# Patient Record
Sex: FEMALE | Race: WHITE | NOT HISPANIC OR LATINO | Employment: OTHER | URBAN - METROPOLITAN AREA
[De-identification: names, ages, dates, MRNs, and addresses within clinical notes are randomized per-mention and may not be internally consistent; named-entity substitution may affect disease eponyms.]

---

## 2023-09-19 ENCOUNTER — OFFICE VISIT (OUTPATIENT)
Dept: PRIMARY CARE | Facility: CLINIC | Age: 78
End: 2023-09-19
Payer: MEDICARE

## 2023-09-19 VITALS
OXYGEN SATURATION: 97 % | DIASTOLIC BLOOD PRESSURE: 70 MMHG | WEIGHT: 167 LBS | TEMPERATURE: 97.1 F | HEIGHT: 62 IN | SYSTOLIC BLOOD PRESSURE: 130 MMHG | BODY MASS INDEX: 30.73 KG/M2 | HEART RATE: 68 BPM

## 2023-09-19 DIAGNOSIS — E03.9 ACQUIRED HYPOTHYROIDISM: Chronic | ICD-10-CM

## 2023-09-19 DIAGNOSIS — Z12.31 ENCOUNTER FOR SCREENING MAMMOGRAM FOR MALIGNANT NEOPLASM OF BREAST: ICD-10-CM

## 2023-09-19 DIAGNOSIS — G47.09 OTHER INSOMNIA: ICD-10-CM

## 2023-09-19 DIAGNOSIS — I10 PRIMARY HYPERTENSION: Chronic | ICD-10-CM

## 2023-09-19 DIAGNOSIS — Z00.00 ROUTINE GENERAL MEDICAL EXAMINATION AT HEALTH CARE FACILITY: Primary | ICD-10-CM

## 2023-09-19 DIAGNOSIS — K90.0 CELIAC DISEASE (HHS-HCC): Chronic | ICD-10-CM

## 2023-09-19 DIAGNOSIS — K21.9 GASTROESOPHAGEAL REFLUX DISEASE WITHOUT ESOPHAGITIS: Chronic | ICD-10-CM

## 2023-09-19 DIAGNOSIS — Z78.0 POSTMENOPAUSAL STATUS (AGE-RELATED) (NATURAL): ICD-10-CM

## 2023-09-19 DIAGNOSIS — Z95.818 STATUS POST PLACEMENT OF IMPLANTABLE LOOP RECORDER: ICD-10-CM

## 2023-09-19 DIAGNOSIS — M54.2 CERVICALGIA: ICD-10-CM

## 2023-09-19 DIAGNOSIS — J30.9 ALLERGIC RHINITIS, UNSPECIFIED SEASONALITY, UNSPECIFIED TRIGGER: Chronic | ICD-10-CM

## 2023-09-19 DIAGNOSIS — Z23 NEED FOR PNEUMOCOCCAL VACCINE: ICD-10-CM

## 2023-09-19 DIAGNOSIS — E78.5 HYPERLIPIDEMIA, UNSPECIFIED HYPERLIPIDEMIA TYPE: ICD-10-CM

## 2023-09-19 DIAGNOSIS — Z23 FLU VACCINE NEED: ICD-10-CM

## 2023-09-19 DIAGNOSIS — Z87.898 HISTORY OF SYNCOPE: ICD-10-CM

## 2023-09-19 PROBLEM — M19.019 OSTEOARTHRITIS OF ACROMIOCLAVICULAR JOINT: Chronic | Status: ACTIVE | Noted: 2023-09-19

## 2023-09-19 PROBLEM — L71.9 ROSACEA: Chronic | Status: ACTIVE | Noted: 2023-09-19

## 2023-09-19 PROCEDURE — 1159F MED LIST DOCD IN RCRD: CPT | Performed by: FAMILY MEDICINE

## 2023-09-19 PROCEDURE — 3078F DIAST BP <80 MM HG: CPT | Performed by: FAMILY MEDICINE

## 2023-09-19 PROCEDURE — 3075F SYST BP GE 130 - 139MM HG: CPT | Performed by: FAMILY MEDICINE

## 2023-09-19 PROCEDURE — G0009 ADMIN PNEUMOCOCCAL VACCINE: HCPCS | Performed by: FAMILY MEDICINE

## 2023-09-19 PROCEDURE — 90662 IIV NO PRSV INCREASED AG IM: CPT | Performed by: FAMILY MEDICINE

## 2023-09-19 PROCEDURE — 99204 OFFICE O/P NEW MOD 45 MIN: CPT | Performed by: FAMILY MEDICINE

## 2023-09-19 PROCEDURE — G0439 PPPS, SUBSEQ VISIT: HCPCS | Performed by: FAMILY MEDICINE

## 2023-09-19 PROCEDURE — 1160F RVW MEDS BY RX/DR IN RCRD: CPT | Performed by: FAMILY MEDICINE

## 2023-09-19 PROCEDURE — G0008 ADMIN INFLUENZA VIRUS VAC: HCPCS | Performed by: FAMILY MEDICINE

## 2023-09-19 PROCEDURE — 90677 PCV20 VACCINE IM: CPT | Performed by: FAMILY MEDICINE

## 2023-09-19 PROCEDURE — 1036F TOBACCO NON-USER: CPT | Performed by: FAMILY MEDICINE

## 2023-09-19 PROCEDURE — 1170F FXNL STATUS ASSESSED: CPT | Performed by: FAMILY MEDICINE

## 2023-09-19 RX ORDER — OMEPRAZOLE 40 MG/1
CAPSULE, DELAYED RELEASE ORAL
COMMUNITY
Start: 2023-08-06 | End: 2024-03-19 | Stop reason: SDUPTHER

## 2023-09-19 RX ORDER — CETIRIZINE HYDROCHLORIDE 10 MG/1
TABLET, CHEWABLE ORAL DAILY
COMMUNITY
End: 2024-03-19 | Stop reason: ENTERED-IN-ERROR

## 2023-09-19 RX ORDER — AMITRIPTYLINE HYDROCHLORIDE 25 MG/1
TABLET, FILM COATED ORAL
COMMUNITY
Start: 2023-09-03 | End: 2024-03-19 | Stop reason: SDUPTHER

## 2023-09-19 RX ORDER — AMLODIPINE BESYLATE 5 MG/1
TABLET ORAL
COMMUNITY
Start: 2023-08-30 | End: 2024-03-19 | Stop reason: SDUPTHER

## 2023-09-19 RX ORDER — METRONIDAZOLE 7.5 MG/G
CREAM TOPICAL 2 TIMES DAILY
COMMUNITY

## 2023-09-19 RX ORDER — FLUTICASONE PROPIONATE 50 MCG
SPRAY, SUSPENSION (ML) NASAL
COMMUNITY
Start: 2023-08-31

## 2023-09-19 RX ORDER — DICLOFENAC SODIUM 10 MG/G
4 GEL TOPICAL 4 TIMES DAILY PRN
COMMUNITY

## 2023-09-19 RX ORDER — MONTELUKAST SODIUM 10 MG/1
TABLET ORAL
COMMUNITY
Start: 2023-08-25 | End: 2024-03-19 | Stop reason: SDUPTHER

## 2023-09-19 RX ORDER — LEVOTHYROXINE SODIUM 25 UG/1
TABLET ORAL
COMMUNITY
Start: 2023-09-03 | End: 2024-03-19 | Stop reason: SDUPTHER

## 2023-09-19 ASSESSMENT — ENCOUNTER SYMPTOMS
RHINORRHEA: 0
SLEEP DISTURBANCE: 1
FEVER: 0
NECK STIFFNESS: 0
ABDOMINAL PAIN: 0
CONSTIPATION: 0
NECK PAIN: 1
NAUSEA: 0
SORE THROAT: 0
FATIGUE: 0
DIARRHEA: 0
PALPITATIONS: 0
VOMITING: 0
SHORTNESS OF BREATH: 0
COUGH: 0
CHILLS: 0
APPETITE CHANGE: 0

## 2023-09-19 ASSESSMENT — ACTIVITIES OF DAILY LIVING (ADL)
DRESSING: INDEPENDENT
DOING_HOUSEWORK: INDEPENDENT
MANAGING_FINANCES: INDEPENDENT
GROCERY_SHOPPING: INDEPENDENT
TAKING_MEDICATION: INDEPENDENT
BATHING: INDEPENDENT

## 2023-09-19 ASSESSMENT — PATIENT HEALTH QUESTIONNAIRE - PHQ9
2. FEELING DOWN, DEPRESSED OR HOPELESS: NOT AT ALL
SUM OF ALL RESPONSES TO PHQ9 QUESTIONS 1 AND 2: 0
1. LITTLE INTEREST OR PLEASURE IN DOING THINGS: NOT AT ALL

## 2023-09-19 NOTE — PROGRESS NOTES
"Subjective   Reason for Visit: Christine Atwood is an 78 y.o. female here for a Medicare Wellness visit.     Past Medical, Surgical, and Family History reviewed and updated in chart.    Reviewed all medications by prescribing practitioner or clinical pharmacist (such as prescriptions, OTCs, herbal therapies and supplements) and documented in the medical record.    Christine presents to establish care. She was previously following with doctor in [place].    Diagnosed with Celiac disease. Adheres closely to gluten-free diet.     Taking thyroid medication. Due for labs to recheck levels.     History of syncope. Had implantable loop recorder placed in 2021. Needs cardiologist to follow with in [place]. Previously seeing Dr. Clfi Kidd in [place].    On amitriptyline for sleep. Overall works well but having some restless sleep.         Patient Care Team:  Dorothea Francois DO as PCP - General (Family Medicine)     Review of Systems   Constitutional:  Negative for appetite change, chills, fatigue and fever.   HENT:  Negative for congestion, rhinorrhea and sore throat.    Respiratory:  Negative for cough and shortness of breath.    Cardiovascular:  Negative for chest pain and palpitations.   Gastrointestinal:  Negative for abdominal pain, constipation, diarrhea, nausea and vomiting.   Musculoskeletal:  Positive for neck pain. Negative for neck stiffness.   Skin:  Negative for rash.   Psychiatric/Behavioral:  Positive for sleep disturbance.        Objective   Vitals:  /70   Pulse 68   Temp 36.2 °C (97.1 °F)   Ht 1.575 m (5' 2\")   Wt 75.8 kg (167 lb)   SpO2 97%   BMI 30.54 kg/m²       Physical Exam  Constitutional:       General: She is not in acute distress.     Appearance: Normal appearance.   HENT:      Head: Normocephalic.      Nose: No congestion.      Mouth/Throat:      Mouth: Mucous membranes are moist.   Eyes:      Extraocular Movements: Extraocular movements intact.      Conjunctiva/sclera: " Conjunctivae normal.   Cardiovascular:      Rate and Rhythm: Normal rate and regular rhythm.      Heart sounds: No murmur heard.  Pulmonary:      Effort: Pulmonary effort is normal.      Breath sounds: No wheezing or rhonchi.   Musculoskeletal:         General: No swelling.      Cervical back: Tenderness (left trapezius) present. No rigidity or bony tenderness. Normal range of motion.   Skin:     General: Skin is warm and dry.   Neurological:      General: No focal deficit present.      Mental Status: She is alert.   Psychiatric:         Mood and Affect: Mood normal.         Behavior: Behavior normal.         Assessment/Plan   Problem List Items Addressed This Visit       Acquired hypothyroidism (Chronic)    Current Assessment & Plan     Check TSH. Continue levothyroxine 25mcg.         Relevant Orders    TSH with reflex to Free T4 if abnormal    TSH with reflex to Free T4 if abnormal    Primary hypertension (Chronic)    Current Assessment & Plan     Controlled.          Relevant Orders    Comprehensive Metabolic Panel    Comprehensive Metabolic Panel    Allergic rhinitis (Chronic)    Current Assessment & Plan     Continue montelukast and cetirizine.         Gastroesophageal reflux disease without esophagitis (Chronic)    Current Assessment & Plan     Continue omeprazole.         Other insomnia    Current Assessment & Plan     Continue amitriptyline.          Celiac disease (Chronic)    Current Assessment & Plan     Continue gluten-free diet.          Status post placement of implantable loop recorder    Current Assessment & Plan     Refer to cardiology for follow-up. Request records from Dr. Kidd in [place].          Relevant Orders    Referral to Cardiology    Cervicalgia    Current Assessment & Plan     Recommend heat, exercises.           Other Visit Diagnoses       Routine general medical examination at health care facility    -  Primary    Hyperlipidemia, unspecified hyperlipidemia type        Relevant  Orders    Lipid Panel    Postmenopausal status (age-related) (natural)        Relevant Orders    XR DEXA bone density    History of syncope        Relevant Orders    Referral to Cardiology    Flu vaccine need        Relevant Orders    Flu vaccine, quadrivalent, high-dose, preservative free, age 65y+ (FLUZONE) (Completed)    Need for pneumococcal vaccine        Relevant Orders    Pneumococcal conjugate vaccine, 20-valent, adult (PREVNAR 20) (Completed)    Encounter for screening mammogram for malignant neoplasm of breast        Relevant Orders    BI mammo bilateral screening tomosynthesis

## 2023-10-09 ENCOUNTER — APPOINTMENT (OUTPATIENT)
Dept: CARDIOLOGY | Facility: CLINIC | Age: 78
End: 2023-10-09
Payer: MEDICARE

## 2023-10-13 ENCOUNTER — APPOINTMENT (OUTPATIENT)
Dept: RADIOLOGY | Facility: CLINIC | Age: 78
End: 2023-10-13
Payer: MEDICARE

## 2023-11-07 ENCOUNTER — APPOINTMENT (OUTPATIENT)
Dept: RADIOLOGY | Facility: CLINIC | Age: 78
End: 2023-11-07
Payer: MEDICARE

## 2023-11-08 ENCOUNTER — CLINICAL SUPPORT (OUTPATIENT)
Dept: AUDIOLOGY | Facility: HOSPITAL | Age: 78
End: 2023-11-08

## 2023-11-08 DIAGNOSIS — H90.3 SENSORINEURAL HEARING LOSS, BILATERAL: Primary | ICD-10-CM

## 2023-11-08 PROCEDURE — V5299 HEARING SERVICE: HCPCS | Performed by: AUDIOLOGIST

## 2023-11-08 ASSESSMENT — PAIN SCALES - GENERAL: PAINLEVEL_OUTOF10: 0 - NO PAIN

## 2023-11-08 ASSESSMENT — PAIN - FUNCTIONAL ASSESSMENT: PAIN_FUNCTIONAL_ASSESSMENT: 0-10

## 2023-11-08 NOTE — PROGRESS NOTES
.chiefAUDIOLOGY HEARING AID CHECK      Name:  Christine Atwood  :  1945  Age:  78 y.o.  Date of Appointment:  2023     History:  Ms. Atwood is seen today for Hearing Aid Check (Transferring service from out of state provider.  She reports that the hearing aids have not sounded clear recently.  She reports no other concerns with the hearing aids today.)    Current hearing aids:  Hearing Aids:  Phonak Audeo M50R  Right # [id number]    Left # [id number]  Speakers: ___   Domes: medium vented    Purchased: out of state, approximately 2019  Repair/loss warranty ends:   Last hearing evaluation: unknown, patient signed release of records form and records have been requested from previous provider    Upon initial examination, the hearing aids sounded good.      Cleaned and checked both devices.    Replaced filters bilaterally.  Replaced domes bilaterally.     Both hearing aids sound and look good on final examination.      Otoscopic observation revealed clear ear canals bilaterally.    Per data logging, this patient wears the hearing aids 7.5 hours / day on average, 76 % in calm environments, 20 % speech in noise, 4 % comfort in noise.  She has not connected her hearing aids to any bluetooth devices and does not wish to do so.    IMPRESSIONS:  Both hearing aids function appropriately following today's routine maintenance procedures.  The patient also reports the aids sound good.      RECOMMENDATIONS:  -Daily use of hearing aids.  -Daily maintenance to be performed at home.   -Follow-up hearing aid check in 6 months or as needed    PATIENT EDUCATION:   Discussed above information with Ms. Atwood .  Questions were addressed and the patient was encouraged to contact our department should concerns arise.    Time:  13:52 to 14:20    FRANCESCA Topete, [facility]  Licensed Audiologist

## 2023-11-13 ENCOUNTER — OFFICE VISIT (OUTPATIENT)
Dept: CARDIOLOGY | Facility: CLINIC | Age: 78
End: 2023-11-13
Payer: MEDICARE

## 2023-11-13 ENCOUNTER — HOSPITAL ENCOUNTER (OUTPATIENT)
Dept: CARDIOLOGY | Facility: CLINIC | Age: 78
Discharge: HOME | End: 2023-11-13
Payer: MEDICARE

## 2023-11-13 VITALS
BODY MASS INDEX: 30.73 KG/M2 | SYSTOLIC BLOOD PRESSURE: 137 MMHG | HEIGHT: 62 IN | HEART RATE: 84 BPM | WEIGHT: 167 LBS | DIASTOLIC BLOOD PRESSURE: 61 MMHG | OXYGEN SATURATION: 98 %

## 2023-11-13 DIAGNOSIS — Z95.818 STATUS POST PLACEMENT OF IMPLANTABLE LOOP RECORDER: ICD-10-CM

## 2023-11-13 DIAGNOSIS — Z87.898 HISTORY OF SYNCOPE: Primary | ICD-10-CM

## 2023-11-13 DIAGNOSIS — Z87.898 HISTORY OF SYNCOPE: ICD-10-CM

## 2023-11-13 DIAGNOSIS — R55 SYNCOPE AND COLLAPSE: ICD-10-CM

## 2023-11-13 DIAGNOSIS — R55 SYNCOPE AND COLLAPSE: Primary | ICD-10-CM

## 2023-11-13 PROCEDURE — 99204 OFFICE O/P NEW MOD 45 MIN: CPT | Performed by: INTERNAL MEDICINE

## 2023-11-13 PROCEDURE — 3075F SYST BP GE 130 - 139MM HG: CPT | Performed by: INTERNAL MEDICINE

## 2023-11-13 PROCEDURE — 93005 ELECTROCARDIOGRAM TRACING: CPT | Performed by: INTERNAL MEDICINE

## 2023-11-13 PROCEDURE — 1159F MED LIST DOCD IN RCRD: CPT | Performed by: INTERNAL MEDICINE

## 2023-11-13 PROCEDURE — 99214 OFFICE O/P EST MOD 30 MIN: CPT | Performed by: INTERNAL MEDICINE

## 2023-11-13 PROCEDURE — 93291 INTERROG DEV EVAL SCRMS IP: CPT

## 2023-11-13 PROCEDURE — 1160F RVW MEDS BY RX/DR IN RCRD: CPT | Performed by: INTERNAL MEDICINE

## 2023-11-13 PROCEDURE — 1036F TOBACCO NON-USER: CPT | Performed by: INTERNAL MEDICINE

## 2023-11-13 PROCEDURE — 93291 INTERROG DEV EVAL SCRMS IP: CPT | Performed by: INTERNAL MEDICINE

## 2023-11-13 PROCEDURE — 1126F AMNT PAIN NOTED NONE PRSNT: CPT | Performed by: INTERNAL MEDICINE

## 2023-11-13 PROCEDURE — 3078F DIAST BP <80 MM HG: CPT | Performed by: INTERNAL MEDICINE

## 2023-11-13 ASSESSMENT — ENCOUNTER SYMPTOMS
DEPRESSION: 0
OCCASIONAL FEELINGS OF UNSTEADINESS: 0
SYNCOPE: 1
PSYCHIATRIC NEGATIVE: 1
RESPIRATORY NEGATIVE: 1
DIARRHEA: 1
ENDOCRINE NEGATIVE: 1
MUSCULOSKELETAL NEGATIVE: 1
CONSTITUTIONAL NEGATIVE: 1
EYES NEGATIVE: 1
LOSS OF SENSATION IN FEET: 0

## 2023-11-13 ASSESSMENT — PAIN SCALES - GENERAL: PAINLEVEL: 0-NO PAIN

## 2023-11-13 NOTE — PROGRESS NOTES
CHARLES Atwood is an 79 y/o female referred by her PCP, Dr Francois, to establish care for management of her ILR.      PMH includes HTN, hypothyroidism, celiac disease, GERD, OA, and syncope s/p BIOTRONIK ILR implant (2021 [facility]).      Pt recently establish with her PCP after moving from [place].  Pt has a history of an ILR placement for a syncopal episode that occurred in 2021.  She had been following with Dr Clif Kidd in [place].      She has not been told of any arrhythmias on her loop by her monitoring cardiologist.     Pt recently presented to [facility] ED with lightheadedness, nausea and diaphoresis lasting less than 1 min.    She reported that if felt similar to her prior vasovagal episodes.  Pts work up in the ED was unremarkable.  She presents today for evaluation of syncope.        Review of Systems   Constitutional: Negative.   HENT: Negative.     Eyes: Negative.    Cardiovascular:  Positive for syncope.   Respiratory: Negative.     Endocrine: Negative.    Skin: Negative.    Musculoskeletal: Negative.    Gastrointestinal:  Positive for diarrhea.   Genitourinary: Negative.    Psychiatric/Behavioral: Negative.         Objective   Constitutional:       Appearance: Healthy appearance. Not in distress.   Eyes:      Pupils: Pupils are equal, round, and reactive to light.   Neck:      Thyroid: Thyroid normal.      Vascular: JVD normal.   Pulmonary:      Effort: Pulmonary effort is normal.      Breath sounds: Normal breath sounds.   Musculoskeletal: Normal range of motion.      Cervical back: Normal range of motion and neck supple. Skin:     General: Skin is warm and moist.   Neurological:      General: No focal deficit present.      Mental Status: Alert and oriented to person, place and time.      Motor: Motor function is intact.      Gait: Gait is intact.       Assessment/Plan   Diagnoses of Status post placement of implantable loop recorder and History of syncope were pertinent to this  visit.    PRESENTS TO ESTABLISH IN OUR DEVICE CLINICS     Hx OF VASO-VAGAL SYNCOPE     HAS A BIOTRONIK LOOP MONITOR - NO ARRHYTHMIAS DETECTED THUS FAR PER PT.  LAST EPISODE OF SYNCOPE PT WAS SEEN AT [facility] ED. W/U WAS UNREMARKABLE BUT THEY WERE UNABLE TO INTERROGATE HER LOOP MONITOR.     TODAY'S ECG: SINUS RHYTHM AT 84 bpm; NONSPECIFIC ST CHANGES.        WE WILL ENROLL HER IN OUR DEVICE CLINIC.   WE CONTACT ED BIOTRONIK AND WERE ABLE TO INTERROGATE HER LOOP.  IT SHOWED SIGNIFICANT BRADYCARDIA DURING HER LAST EPISODE OF VASOVAGAL SYNCOPE 10/18/23, WHICH IS EXPECTED. SHE DOES NOT HAVE SINUS SRAVAN USUALLY.   IT ALSO SHOWED ONE EPISODE OF VT FOR 6-8 SECs IN OCT. 2022.    WE WILL CONT MONITORING  ALSO, WILL REFER TO NEUROLOGY AUTONOMIC CLINIC               MD Joseph Sutherland Master Clinician of Cardiovascular [place].   [facility] [place].   Director of Electrophysiology Center  Professor of Medicine.   [employer].

## 2023-11-22 ENCOUNTER — HOSPITAL ENCOUNTER (OUTPATIENT)
Dept: RADIOLOGY | Facility: CLINIC | Age: 78
Discharge: HOME | End: 2023-11-22
Payer: MEDICARE

## 2023-11-22 DIAGNOSIS — Z12.31 ENCOUNTER FOR SCREENING MAMMOGRAM FOR MALIGNANT NEOPLASM OF BREAST: ICD-10-CM

## 2023-11-22 PROCEDURE — 77067 SCR MAMMO BI INCL CAD: CPT | Mod: BILATERAL PROCEDURE | Performed by: RADIOLOGY

## 2023-11-22 PROCEDURE — 77063 BREAST TOMOSYNTHESIS BI: CPT | Mod: BILATERAL PROCEDURE | Performed by: RADIOLOGY

## 2023-11-22 PROCEDURE — 77067 SCR MAMMO BI INCL CAD: CPT

## 2023-12-05 ENCOUNTER — TELEPHONE (OUTPATIENT)
Dept: PRIMARY CARE | Facility: CLINIC | Age: 78
End: 2023-12-05
Payer: MEDICARE

## 2023-12-05 DIAGNOSIS — R92.8 ABNORMAL MAMMOGRAM OF RIGHT BREAST: Primary | ICD-10-CM

## 2023-12-05 NOTE — TELEPHONE ENCOUNTER
Please let patient know that her mammogram was abnormal. There was an abnormality seen in the right breast. The radiologist is requesting further images to help determine cause. I have ordered another mammogram and US to further evaluate. I would recommend she try to see if she can have old mammogram images sent in CD from hospital in [place] where previous mammograms done.

## 2023-12-08 ENCOUNTER — HOSPITAL ENCOUNTER (OUTPATIENT)
Dept: RADIOLOGY | Facility: EXTERNAL LOCATION | Age: 78
Discharge: HOME | End: 2023-12-08

## 2023-12-19 ENCOUNTER — TELEPHONE (OUTPATIENT)
Dept: PRIMARY CARE | Facility: CLINIC | Age: 78
End: 2023-12-19
Payer: MEDICARE

## 2023-12-19 NOTE — TELEPHONE ENCOUNTER
----- Message from Dorothea Francois, DO sent at 12/19/2023  3:22 PM EST -----  Please let patient know that the radiologist did review her CD of prior mammograms, but they are still seeing a new abnormality and recommend proceed with mammogram and US to further assess.

## 2024-01-04 ENCOUNTER — HOSPITAL ENCOUNTER (OUTPATIENT)
Dept: RADIOLOGY | Facility: HOSPITAL | Age: 79
Discharge: HOME | End: 2024-01-04
Payer: MEDICARE

## 2024-01-04 DIAGNOSIS — R92.8 ABNORMAL MAMMOGRAM OF RIGHT BREAST: ICD-10-CM

## 2024-01-04 PROCEDURE — 76982 USE 1ST TARGET LESION: CPT | Mod: RT

## 2024-01-04 PROCEDURE — 77065 DX MAMMO INCL CAD UNI: CPT | Mod: RT

## 2024-01-04 PROCEDURE — 76641 ULTRASOUND BREAST COMPLETE: CPT | Mod: RT

## 2024-01-23 ENCOUNTER — APPOINTMENT (OUTPATIENT)
Dept: PRIMARY CARE | Facility: CLINIC | Age: 79
End: 2024-01-23
Payer: MEDICARE

## 2024-02-02 ENCOUNTER — OFFICE VISIT (OUTPATIENT)
Dept: PRIMARY CARE | Facility: CLINIC | Age: 79
End: 2024-02-02
Payer: MEDICARE

## 2024-02-02 VITALS
TEMPERATURE: 97.4 F | SYSTOLIC BLOOD PRESSURE: 122 MMHG | WEIGHT: 167 LBS | DIASTOLIC BLOOD PRESSURE: 58 MMHG | HEART RATE: 56 BPM | BODY MASS INDEX: 30.54 KG/M2 | OXYGEN SATURATION: 96 %

## 2024-02-02 DIAGNOSIS — R55 SYNCOPE, UNSPECIFIED SYNCOPE TYPE: Primary | ICD-10-CM

## 2024-02-02 DIAGNOSIS — F41.9 ANXIETY: ICD-10-CM

## 2024-02-02 DIAGNOSIS — Z95.818 STATUS POST PLACEMENT OF IMPLANTABLE LOOP RECORDER: ICD-10-CM

## 2024-02-02 PROCEDURE — 3074F SYST BP LT 130 MM HG: CPT | Performed by: FAMILY MEDICINE

## 2024-02-02 PROCEDURE — 1126F AMNT PAIN NOTED NONE PRSNT: CPT | Performed by: FAMILY MEDICINE

## 2024-02-02 PROCEDURE — 1160F RVW MEDS BY RX/DR IN RCRD: CPT | Performed by: FAMILY MEDICINE

## 2024-02-02 PROCEDURE — 3078F DIAST BP <80 MM HG: CPT | Performed by: FAMILY MEDICINE

## 2024-02-02 PROCEDURE — 1159F MED LIST DOCD IN RCRD: CPT | Performed by: FAMILY MEDICINE

## 2024-02-02 PROCEDURE — 99215 OFFICE O/P EST HI 40 MIN: CPT | Performed by: FAMILY MEDICINE

## 2024-02-02 PROCEDURE — 1036F TOBACCO NON-USER: CPT | Performed by: FAMILY MEDICINE

## 2024-02-02 RX ORDER — SERTRALINE HYDROCHLORIDE 25 MG/1
25 TABLET, FILM COATED ORAL DAILY
Qty: 30 TABLET | Refills: 0 | Status: SHIPPED | OUTPATIENT
Start: 2024-02-02 | End: 2024-03-19 | Stop reason: ALTCHOICE

## 2024-02-02 RX ORDER — SERTRALINE HYDROCHLORIDE 25 MG/1
25 TABLET, FILM COATED ORAL DAILY
Qty: 90 TABLET | Refills: 0 | Status: SHIPPED | OUTPATIENT
Start: 2024-02-02 | End: 2024-03-19 | Stop reason: ALTCHOICE

## 2024-02-02 ASSESSMENT — ENCOUNTER SYMPTOMS
FEVER: 0
HEADACHES: 0
CHILLS: 0
COUGH: 0
NUMBNESS: 0
DIZZINESS: 0
SHORTNESS OF BREATH: 0
ABDOMINAL PAIN: 0
DIARRHEA: 0
VOMITING: 0
NAUSEA: 0
NERVOUS/ANXIOUS: 1
WEAKNESS: 0

## 2024-02-02 NOTE — PROGRESS NOTES
Subjective   Patient ID: Christine Atwood is a 78 y.o. female who presents for PT Treatment (Discuss order for PT ).    Christine presents to discuss syncope. Patient reports that she has had four episodes of syncope since the fall. Most recent episode was yesterday. Was feeling nervous before going down to breakfast. Got up to go to bathroom during breakfast. Was sitting on toilet when felt lightheaded and passed out. Came to a few minutes later. Felt tired for 15 minutes but then felt better. Does have implantable loop recorder. She is unsure if results are being sent to Dr. Vasquez. Patient states that she would like to see a different cardiologist moving forward.     Patient has noticed that she is more anxious over the past few months.  agrees that patient has a significant amount of anxiety. Is wondering if this may be affecting her episodes of syncope.          Review of Systems   Constitutional:  Negative for chills and fever.   Respiratory:  Negative for cough and shortness of breath.    Cardiovascular:  Negative for chest pain.   Gastrointestinal:  Negative for abdominal pain, diarrhea, nausea and vomiting.   Neurological:  Positive for syncope. Negative for dizziness, weakness, numbness and headaches.   Psychiatric/Behavioral:  The patient is nervous/anxious.        Objective   /58   Pulse 56   Temp 36.3 °C (97.4 °F)   Wt 75.8 kg (167 lb)   SpO2 96%   BMI 30.54 kg/m²     Physical Exam  Constitutional:       General: She is not in acute distress.     Appearance: Normal appearance.   HENT:      Head: Normocephalic.      Mouth/Throat:      Mouth: Mucous membranes are moist.   Eyes:      Extraocular Movements: Extraocular movements intact.      Conjunctiva/sclera: Conjunctivae normal.   Cardiovascular:      Rate and Rhythm: Normal rate and regular rhythm.      Heart sounds: No murmur heard.  Pulmonary:      Breath sounds: No wheezing or rhonchi.   Musculoskeletal:      Cervical back: Neck supple.    Skin:     General: Skin is warm and dry.   Neurological:      Mental Status: She is alert.   Psychiatric:         Mood and Affect: Mood is anxious.         Behavior: Behavior normal.         Assessment/Plan   Problem List Items Addressed This Visit             ICD-10-CM    Status post placement of implantable loop recorder Z95.818     Patient prefers not to follow-up with Dr. Vasquez. Referred to Dr. Rojo.          Relevant Orders    Referral to Cardiology    Syncope - Primary R55     Likely vasovagal episode, though there may be psychogenic component. Patient referred to new EP doc per her request. Has implantable loop recorder; will see if data can be uploaded.         Relevant Orders    Referral to Cardiology    Anxiety F41.9     Start sertraline 25mg.         Relevant Medications    sertraline (Zoloft) 25 mg tablet    sertraline (Zoloft) 25 mg tablet        Time Based Billing:  - Prep Time on Date of Patient Encounter:  3 minutes  - Time Directly with Patient/Family/Caregiver:   35 minutes  - Documentation Time:   5 minutes    Total Minutes:  43

## 2024-02-02 NOTE — ASSESSMENT & PLAN NOTE
Likely vasovagal episode, though there may be psychogenic component. Patient referred to new EP doc per her request. Has implantable loop recorder; will see if data can be uploaded.

## 2024-02-22 NOTE — PROGRESS NOTES
[facility] Heart and Vascular Electrophysiology    Patient Name: Christine Atwood  Patient : 1945    Referred  for      Christine Atwood is a 78 y.o. year old female patient with    1.  Hypertension  2.  Hypothyroidism  3.  Syncope: Patient has a history of syncope and had Biotronik ILR placed in  when she lived in [place].  She more recently presented to [facility] emergency department with lightheadedness, nausea, diaphoresis that lasted approximately 1 minute.  She followed up with Dr. Vasquez in 2023 and at that time heart ILR showed significant bradycardia during the episode of near syncope in October.  It also showed 1 episode of NSVT for 6 to 8 seconds in 2022.  She was referred to autonomic neurology clinic.     Patient here for evaluation for syncope. She reports she has had multiple syncopal episodes in the past few years. She has had 4-5 episodes since October. Her last episode was in 2024 as while she was in the dinning room at her independent living facility. She had gone to bathroom during the meal. As she was having a bowel movement the next thing she remembers is waking up on the floor. She reports she had felt off the morning, like anxious. Her  has witnessed episodes which she is unrousable for 3-4 min. No further syncopal episodes. She was started on Sertraline about 1 month ago.     Past Medical History:  She has a past medical history of Hypertension and Hypothyroidism.    Past Surgical History:  She has a past surgical history that includes Hysterectomy; Appendectomy; and Breast biopsy.      Social History:  She reports that she has never smoked. She has never used smokeless tobacco. She reports that she does not currently use alcohol. She reports that she does not use drugs.    Family History:  Family History   Problem Relation Name Age of Onset    Epilepsy Mother      Cirrhosis Mother      Dementia Half-Sister      Lymphoma Half-Sister      Hypertension  "Half-Sister      Atrial fibrillation Half-Brother      Hyperlipidemia Half-Brother          Allergies:  Amoxicillin, Biaxin [clarithromycin], Cephalexin, Clindamycin, and Penicillins    Outpatient Medications:  Current Outpatient Medications   Medication Instructions    amitriptyline (Elavil) 25 mg tablet     amLODIPine (Norvasc) 5 mg tablet     cetirizine (ZyrTEC) 10 mg chewable tablet oral, Daily    diclofenac sodium (VOLTAREN) 4 g, Topical, 4 times daily PRN    fluticasone (Flonase) 50 mcg/actuation nasal spray     levothyroxine (Synthroid, Levoxyl) 25 mcg tablet     metroNIDAZOLE (Metrocream) 0.75 % cream Topical, 2 times daily    montelukast (Singulair) 10 mg tablet     omeprazole (PriLOSEC) 40 mg DR capsule     sertraline (ZOLOFT) 25 mg, oral, Daily    sertraline (ZOLOFT) 25 mg, oral, Daily        ROS:  A 14 point review of systems was done and is negative other than as stated in HPI    Vitals:  Vitals:    02/29/24 1548   BP: 122/74   Pulse: 84   Weight: 73.5 kg (162 lb)   Height: 1.575 m (5' 2\")       Physical Exam  Constitutional:       Appearance: Normal appearance.   Cardiovascular:      Rate and Rhythm: Normal rate and regular rhythm.      Pulses: Normal pulses.      Heart sounds: Normal heart sounds.   Pulmonary:      Effort: Pulmonary effort is normal.      Breath sounds: Normal breath sounds.   Musculoskeletal:         General: Normal range of motion.      Right lower leg: No edema.      Left lower leg: No edema.   Skin:     General: Skin is warm and dry.   Neurological:      Mental Status: She is alert and oriented to person, place, and time.   Psychiatric:         Mood and Affect: Mood normal.        Labs:   No results found for: \"WBC\", \"HGB\", \"HCT\", \"PLT\", \"ALT\", \"AST\", \"NA\", \"K\", \"CL\", \"CREATININE\", \"BUN\", \"CO2\", \"TSH\", \"INR\", \"GLUF\"     Cardiac Testing:  ECG    Echocardiogram  No results found for this or any previous visit from the past 1095 days.      Assessment:   Syncope:  Mostly likely these " episodes are vasovagal related. Her last episode of syncope with significant bradycardia but other episodes with minimal slowing. Likely mixed vasodepressor and cardioinhibitory. We asked patient to avoid prolonged standing, stay well hydrated, and increase sodium intake to help prevent further episodes.     Plan:  Tilt table test with Dr Reyna CM report check  Continue current medications

## 2024-02-29 ENCOUNTER — OFFICE VISIT (OUTPATIENT)
Dept: CARDIOLOGY | Facility: CLINIC | Age: 79
End: 2024-02-29
Payer: MEDICARE

## 2024-02-29 VITALS
BODY MASS INDEX: 29.81 KG/M2 | WEIGHT: 162 LBS | HEART RATE: 84 BPM | SYSTOLIC BLOOD PRESSURE: 122 MMHG | DIASTOLIC BLOOD PRESSURE: 74 MMHG | HEIGHT: 62 IN

## 2024-02-29 DIAGNOSIS — R55 SYNCOPE AND COLLAPSE: ICD-10-CM

## 2024-02-29 DIAGNOSIS — Z87.898 HISTORY OF SYNCOPE: Primary | ICD-10-CM

## 2024-02-29 PROCEDURE — 1160F RVW MEDS BY RX/DR IN RCRD: CPT | Performed by: INTERNAL MEDICINE

## 2024-02-29 PROCEDURE — 1126F AMNT PAIN NOTED NONE PRSNT: CPT | Performed by: INTERNAL MEDICINE

## 2024-02-29 PROCEDURE — 99214 OFFICE O/P EST MOD 30 MIN: CPT | Performed by: INTERNAL MEDICINE

## 2024-02-29 PROCEDURE — 1036F TOBACCO NON-USER: CPT | Performed by: INTERNAL MEDICINE

## 2024-02-29 PROCEDURE — 3074F SYST BP LT 130 MM HG: CPT | Performed by: INTERNAL MEDICINE

## 2024-02-29 PROCEDURE — 93000 ELECTROCARDIOGRAM COMPLETE: CPT | Performed by: INTERNAL MEDICINE

## 2024-02-29 PROCEDURE — 3078F DIAST BP <80 MM HG: CPT | Performed by: INTERNAL MEDICINE

## 2024-02-29 PROCEDURE — 1159F MED LIST DOCD IN RCRD: CPT | Performed by: INTERNAL MEDICINE

## 2024-02-29 NOTE — PATIENT INSTRUCTIONS
Tilt table test  Remote ILR check  Please push remote button if you experience any further episodes of passing out.

## 2024-03-04 ENCOUNTER — APPOINTMENT (OUTPATIENT)
Dept: NEUROLOGY | Facility: CLINIC | Age: 79
End: 2024-03-04
Payer: MEDICARE

## 2024-03-07 ENCOUNTER — TELEPHONE (OUTPATIENT)
Dept: CARDIOLOGY | Facility: CLINIC | Age: 79
End: 2024-03-07
Payer: MEDICARE

## 2024-03-07 NOTE — TELEPHONE ENCOUNTER
Called patient with instructions. NPO after midnight.  Aware of date and time of arrival.  No caffeine for 24 hours prior, no ETOH or nicotine for 6 hours prior.  Someone needs to drive you home after.  No lotions or creams , wear comfortable clothing.  She expressed understanding.           ----- Message from Rohini Mcgraw sent at 3/6/2024  1:23 PM EST -----  Regarding: Tilt 4/10/2024  Patient is scheduled for Tilt on: 4/10/2024  Arrival: 10:45  Please call pt with instructions. Thank you.

## 2024-03-12 ENCOUNTER — LAB (OUTPATIENT)
Dept: LAB | Facility: LAB | Age: 79
End: 2024-03-12
Payer: MEDICARE

## 2024-03-12 DIAGNOSIS — E78.5 HYPERLIPIDEMIA, UNSPECIFIED HYPERLIPIDEMIA TYPE: ICD-10-CM

## 2024-03-12 DIAGNOSIS — E03.9 ACQUIRED HYPOTHYROIDISM: ICD-10-CM

## 2024-03-12 DIAGNOSIS — I10 PRIMARY HYPERTENSION: Chronic | ICD-10-CM

## 2024-03-12 LAB
ALBUMIN SERPL BCP-MCNC: 4 G/DL (ref 3.4–5)
ALP SERPL-CCNC: 64 U/L (ref 33–136)
ALT SERPL W P-5'-P-CCNC: 19 U/L (ref 7–45)
ANION GAP SERPL CALC-SCNC: 13 MMOL/L (ref 10–20)
AST SERPL W P-5'-P-CCNC: 14 U/L (ref 9–39)
BILIRUB SERPL-MCNC: 0.4 MG/DL (ref 0–1.2)
BUN SERPL-MCNC: 16 MG/DL (ref 6–23)
CALCIUM SERPL-MCNC: 9.1 MG/DL (ref 8.6–10.3)
CHLORIDE SERPL-SCNC: 102 MMOL/L (ref 98–107)
CHOLEST SERPL-MCNC: 240 MG/DL (ref 0–199)
CHOLESTEROL/HDL RATIO: 5.2
CO2 SERPL-SCNC: 27 MMOL/L (ref 21–32)
CREAT SERPL-MCNC: 0.84 MG/DL (ref 0.5–1.05)
EGFRCR SERPLBLD CKD-EPI 2021: 70 ML/MIN/1.73M*2
GLUCOSE SERPL-MCNC: 150 MG/DL (ref 74–99)
HDLC SERPL-MCNC: 46.4 MG/DL
LDLC SERPL CALC-MCNC: 130 MG/DL
NON HDL CHOLESTEROL: 194 MG/DL (ref 0–149)
POTASSIUM SERPL-SCNC: 4 MMOL/L (ref 3.5–5.3)
PROT SERPL-MCNC: 6.9 G/DL (ref 6.4–8.2)
SODIUM SERPL-SCNC: 138 MMOL/L (ref 136–145)
T4 FREE SERPL-MCNC: 0.63 NG/DL (ref 0.61–1.12)
TRIGL SERPL-MCNC: 320 MG/DL (ref 0–149)
TSH SERPL-ACNC: 4.77 MIU/L (ref 0.44–3.98)
VLDL: 64 MG/DL (ref 0–40)

## 2024-03-12 PROCEDURE — 84443 ASSAY THYROID STIM HORMONE: CPT

## 2024-03-12 PROCEDURE — 84439 ASSAY OF FREE THYROXINE: CPT

## 2024-03-12 PROCEDURE — 80061 LIPID PANEL: CPT

## 2024-03-12 PROCEDURE — 36415 COLL VENOUS BLD VENIPUNCTURE: CPT

## 2024-03-12 PROCEDURE — 80053 COMPREHEN METABOLIC PANEL: CPT

## 2024-03-18 ENCOUNTER — APPOINTMENT (OUTPATIENT)
Dept: CARDIOLOGY | Facility: CLINIC | Age: 79
End: 2024-03-18
Payer: MEDICARE

## 2024-03-19 ENCOUNTER — TELEPHONE (OUTPATIENT)
Dept: PRIMARY CARE | Facility: CLINIC | Age: 79
End: 2024-03-19

## 2024-03-19 ENCOUNTER — OFFICE VISIT (OUTPATIENT)
Dept: PRIMARY CARE | Facility: CLINIC | Age: 79
End: 2024-03-19
Payer: MEDICARE

## 2024-03-19 VITALS
HEART RATE: 90 BPM | SYSTOLIC BLOOD PRESSURE: 126 MMHG | WEIGHT: 169 LBS | BODY MASS INDEX: 30.91 KG/M2 | TEMPERATURE: 97.3 F | DIASTOLIC BLOOD PRESSURE: 76 MMHG | OXYGEN SATURATION: 98 %

## 2024-03-19 DIAGNOSIS — K21.9 GASTROESOPHAGEAL REFLUX DISEASE WITHOUT ESOPHAGITIS: Chronic | ICD-10-CM

## 2024-03-19 DIAGNOSIS — L72.0 MILIA: ICD-10-CM

## 2024-03-19 DIAGNOSIS — J30.9 ALLERGIC RHINITIS, UNSPECIFIED SEASONALITY, UNSPECIFIED TRIGGER: Chronic | ICD-10-CM

## 2024-03-19 DIAGNOSIS — K90.0 CELIAC DISEASE (HHS-HCC): Chronic | ICD-10-CM

## 2024-03-19 DIAGNOSIS — I10 PRIMARY HYPERTENSION: Chronic | ICD-10-CM

## 2024-03-19 DIAGNOSIS — E03.9 ACQUIRED HYPOTHYROIDISM: Primary | Chronic | ICD-10-CM

## 2024-03-19 DIAGNOSIS — R73.9 HYPERGLYCEMIA: ICD-10-CM

## 2024-03-19 DIAGNOSIS — G47.09 OTHER INSOMNIA: ICD-10-CM

## 2024-03-19 DIAGNOSIS — L71.9 ROSACEA: Chronic | ICD-10-CM

## 2024-03-19 DIAGNOSIS — F41.9 ANXIETY: ICD-10-CM

## 2024-03-19 PROBLEM — M54.2 CERVICALGIA: Status: RESOLVED | Noted: 2023-09-19 | Resolved: 2024-03-19

## 2024-03-19 PROBLEM — M18.12 ARTHRITIS OF CARPOMETACARPAL (CMC) JOINT OF LEFT THUMB: Status: ACTIVE | Noted: 2024-03-19

## 2024-03-19 PROCEDURE — 99215 OFFICE O/P EST HI 40 MIN: CPT | Performed by: FAMILY MEDICINE

## 2024-03-19 PROCEDURE — 1036F TOBACCO NON-USER: CPT | Performed by: FAMILY MEDICINE

## 2024-03-19 PROCEDURE — 3078F DIAST BP <80 MM HG: CPT | Performed by: FAMILY MEDICINE

## 2024-03-19 PROCEDURE — 1160F RVW MEDS BY RX/DR IN RCRD: CPT | Performed by: FAMILY MEDICINE

## 2024-03-19 PROCEDURE — 3074F SYST BP LT 130 MM HG: CPT | Performed by: FAMILY MEDICINE

## 2024-03-19 PROCEDURE — 1159F MED LIST DOCD IN RCRD: CPT | Performed by: FAMILY MEDICINE

## 2024-03-19 RX ORDER — MONTELUKAST SODIUM 10 MG/1
10 TABLET ORAL NIGHTLY
Qty: 90 TABLET | Refills: 1 | Status: SHIPPED | OUTPATIENT
Start: 2024-03-19

## 2024-03-19 RX ORDER — AMITRIPTYLINE HYDROCHLORIDE 25 MG/1
TABLET, FILM COATED ORAL
Qty: 30 TABLET | Refills: 0
Start: 2024-03-19 | End: 2024-05-19

## 2024-03-19 RX ORDER — CETIRIZINE HYDROCHLORIDE 10 MG/1
10 TABLET ORAL DAILY
Qty: 90 TABLET | Refills: 1 | Status: CANCELLED | OUTPATIENT
Start: 2024-03-19

## 2024-03-19 RX ORDER — LEVOTHYROXINE SODIUM 25 UG/1
TABLET ORAL
Qty: 120 TABLET | Refills: 0 | Status: SHIPPED | OUTPATIENT
Start: 2024-03-19

## 2024-03-19 RX ORDER — SERTRALINE HYDROCHLORIDE 25 MG/1
25 TABLET, FILM COATED ORAL DAILY
Qty: 90 TABLET | Refills: 1 | Status: SHIPPED | OUTPATIENT
Start: 2024-03-19 | End: 2024-04-01

## 2024-03-19 RX ORDER — CETIRIZINE HYDROCHLORIDE 10 MG/1
10 TABLET ORAL DAILY
Qty: 90 TABLET | Refills: 1 | Status: SHIPPED | OUTPATIENT
Start: 2024-03-19

## 2024-03-19 RX ORDER — OMEPRAZOLE 40 MG/1
40 CAPSULE, DELAYED RELEASE ORAL DAILY
Qty: 90 CAPSULE | Refills: 1 | Status: SHIPPED | OUTPATIENT
Start: 2024-03-19

## 2024-03-19 RX ORDER — AMLODIPINE BESYLATE 5 MG/1
5 TABLET ORAL DAILY
Qty: 90 TABLET | Refills: 1 | Status: SHIPPED | OUTPATIENT
Start: 2024-03-19

## 2024-03-19 ASSESSMENT — ENCOUNTER SYMPTOMS
CHILLS: 0
CONSTIPATION: 0
DYSURIA: 0
HEADACHES: 0
VOMITING: 0
DIARRHEA: 0
COUGH: 0
SHORTNESS OF BREATH: 0
ABDOMINAL PAIN: 0
NAUSEA: 0
FEVER: 0

## 2024-03-19 NOTE — TELEPHONE ENCOUNTER
Let patient know that we received a message from her insurance that cetirizine will not be covered under her plan. Will need to get the medication OTC>

## 2024-03-19 NOTE — PROGRESS NOTES
Subjective   Patient ID: Christine Atwood is a 78 y.o. female who presents for Anxiety, GERD, Hypertension, and Hypothyroidism (Review BW).    Christine presents for follow-up. She has been feeling well. No further syncopal episodes since last visit. Does occasionally get dizzy. Did establish with cardiology. Has device to indicate when having symptoms for implantable loop recorder.     She has a skin lesion by her nose that she would like to have examined. It is not itchy or bleeding.     Also has difficulty sleeping. Often wakes up during the night. Was prescribed amitriptyline for sleep but is unsure how well it is working.          Review of Systems   Constitutional:  Negative for chills and fever.   HENT:  Negative for congestion.    Respiratory:  Negative for cough and shortness of breath.    Cardiovascular:  Negative for chest pain.   Gastrointestinal:  Negative for abdominal pain, constipation, diarrhea, nausea and vomiting.   Genitourinary:  Negative for dysuria.   Neurological:  Negative for headaches.       Objective   /76   Pulse 90   Temp 36.3 °C (97.3 °F)   Wt 76.7 kg (169 lb)   SpO2 98%   BMI 30.91 kg/m²     Physical Exam  Constitutional:       General: She is not in acute distress.     Appearance: Normal appearance.   HENT:      Head: Normocephalic.      Nose: No congestion.      Mouth/Throat:      Mouth: Mucous membranes are moist.   Eyes:      Extraocular Movements: Extraocular movements intact.      Conjunctiva/sclera: Conjunctivae normal.   Cardiovascular:      Rate and Rhythm: Normal rate and regular rhythm.      Heart sounds: No murmur heard.  Pulmonary:      Effort: Pulmonary effort is normal.      Breath sounds: No wheezing or rhonchi.   Abdominal:      Palpations: Abdomen is soft.      Tenderness: There is no abdominal tenderness.   Musculoskeletal:         General: No swelling or tenderness.   Skin:     General: Skin is warm and dry.      Comments: White papule at left nasal fold. No  erythema or ulceration.   Neurological:      General: No focal deficit present.      Mental Status: She is alert.   Psychiatric:         Mood and Affect: Mood normal.         Behavior: Behavior normal.         Assessment/Plan   Problem List Items Addressed This Visit             ICD-10-CM    Acquired hypothyroidism - Primary (Chronic) E03.9     Increase levothyroxine to 1.5 tabs on Sunday; continue 1 tab all other days.         Relevant Medications    levothyroxine (Synthroid, Levoxyl) 25 mcg tablet    Other Relevant Orders    Thyroid Stimulating Hormone    Primary hypertension (Chronic) I10    Relevant Medications    amLODIPine (Norvasc) 5 mg tablet    Allergic rhinitis (Chronic) J30.9    Relevant Medications    montelukast (Singulair) 10 mg tablet    cetirizine (ZyrTEC) 10 mg tablet    Gastroesophageal reflux disease without esophagitis (Chronic) K21.9    Relevant Medications    omeprazole (PriLOSEC) 40 mg DR capsule    Other insomnia G47.09     Taper off amitriptyline.          Relevant Medications    amitriptyline (Elavil) 25 mg tablet    Celiac disease (Chronic) K90.0    Rosacea (Chronic) L71.9     Continue metronidazole as needed.          Anxiety F41.9     Stable.          Relevant Medications    sertraline (Zoloft) 25 mg tablet     Other Visit Diagnoses         Codes    Hyperglycemia     R73.9    Relevant Orders    Hemoglobin A1C    Milia     L72.0    Discussed removal; patient declines at this time.                Time Based Billing:  - Prep Time on Date of Patient Encounter:  5 minutes  - Time Directly with Patient/Family/Caregiver:   40 minutes  - Documentation Time:   7 minutes    Total Minutes:  52

## 2024-03-30 DIAGNOSIS — F41.9 ANXIETY: ICD-10-CM

## 2024-04-01 RX ORDER — SERTRALINE HYDROCHLORIDE 25 MG/1
25 TABLET, FILM COATED ORAL DAILY
Qty: 90 TABLET | Refills: 1 | Status: SHIPPED | OUTPATIENT
Start: 2024-04-01

## 2024-04-10 ENCOUNTER — HOSPITAL ENCOUNTER (OUTPATIENT)
Dept: CARDIOLOGY | Facility: HOSPITAL | Age: 79
Discharge: HOME | End: 2024-04-10
Payer: MEDICARE

## 2024-04-10 VITALS — HEART RATE: 82 BPM | DIASTOLIC BLOOD PRESSURE: 62 MMHG | SYSTOLIC BLOOD PRESSURE: 130 MMHG

## 2024-04-10 DIAGNOSIS — R55 SYNCOPE AND COLLAPSE: ICD-10-CM

## 2024-04-10 PROCEDURE — 93660 TILT TABLE EVALUATION: CPT | Performed by: INTERNAL MEDICINE

## 2024-04-10 PROCEDURE — 93660 TILT TABLE EVALUATION: CPT

## 2024-04-10 ASSESSMENT — PAIN SCALES - GENERAL: PAINLEVEL_OUTOF10: 0 - NO PAIN

## 2024-04-10 ASSESSMENT — PAIN - FUNCTIONAL ASSESSMENT: PAIN_FUNCTIONAL_ASSESSMENT: 0-10

## 2024-05-13 ENCOUNTER — CLINICAL SUPPORT (OUTPATIENT)
Dept: AUDIOLOGY | Facility: HOSPITAL | Age: 79
End: 2024-05-13

## 2024-05-13 DIAGNOSIS — H90.3 SENSORINEURAL HEARING LOSS, BILATERAL: Primary | ICD-10-CM

## 2024-05-13 PROCEDURE — V5299 HEARING SERVICE: HCPCS | Performed by: AUDIOLOGIST

## 2024-05-13 NOTE — PROGRESS NOTES
AUDIOLOGY HEARING AID CHECK      Name:  Christine Atwood  :  1945  Age:  79 y.o.  Date of Appointment:  2024     History:  Ms. Atwood is seen today for Hearing Aid Check (About 1 month ago, the left hearing aid .  She and  changed the filter or dome,and the hearing aid worked.  )  No  other problems were reported today.       Current hearing aids:  Hearing Aids:  Phonak Audeo M50R  Right # [id number]    Left # [id number]  Speakers: #0M (4.0)   Domes: medium vented    [place]  Repair/loss warranty ends:   Last hearing evaluation:     Upon initial examination, the hearing aids sounded good.      Cleaned and checked both devices.    Replaced filters bilaterally.  Replaced domes bilaterally.     Both hearing aids sound and look good on final examination.      Otoscopic observation revealed clear ear canals bilaterally.    Per data logging, this patient wears the hearing aids 9 hours / day on average      IMPRESSIONS:  Both hearing aids function appropriately following today's routine maintenance procedures.  The patient also reports the aids sound good.  We discussed that we can change to the new #5 receivers with cerustop filters, that might be easier for her to change at home.  She will consider.      RECOMMENDATIONS:  -Daily use of hearing aids.  -Daily maintenance to be performed at home.   -Follow-up hearing aid check in 6 months or as needed.  Appointment is scheduled for 2024 at 10am.  -Patient to consider replacement of hearing aids if any changes or problems are noticed going forward, as the current devices are near their end of life.   -Consider new hearing evaluation as her last one was about 5 years ago.  She will discuss this with her physician.      PATIENT EDUCATION:   Discussed above information with Ms. Atwood.  Questions were addressed and the patient was encouraged to contact our department should concerns  arise.    Time:  10:11 to 10:32    FRANCESCA Topete, CCC-A  Senior Audiologist

## 2024-06-10 ENCOUNTER — TELEPHONE (OUTPATIENT)
Dept: CARDIOLOGY | Facility: CLINIC | Age: 79
End: 2024-06-10
Payer: MEDICARE

## 2024-06-10 NOTE — TELEPHONE ENCOUNTER
Talked with patient to let her know her tilt table test was negative for vasovagal response. Patient reports she has been feeling very well with no  further symptoms since starting sertaline. Device clinic has not been able to interrogate ILR so I ask patient to call [facility] [facility] to allow device clinic to interrogate ILR. Patient is agreeable and will let us know if she has any further concerns.

## 2024-06-12 ENCOUNTER — LAB (OUTPATIENT)
Dept: LAB | Facility: LAB | Age: 79
End: 2024-06-12
Payer: MEDICARE

## 2024-06-12 DIAGNOSIS — E03.9 ACQUIRED HYPOTHYROIDISM: Chronic | ICD-10-CM

## 2024-06-12 DIAGNOSIS — I10 PRIMARY HYPERTENSION: Chronic | ICD-10-CM

## 2024-06-12 DIAGNOSIS — R73.9 HYPERGLYCEMIA: ICD-10-CM

## 2024-06-12 LAB
ALBUMIN SERPL BCP-MCNC: 4.3 G/DL (ref 3.4–5)
ALP SERPL-CCNC: 69 U/L (ref 33–136)
ALT SERPL W P-5'-P-CCNC: 25 U/L (ref 7–45)
ANION GAP SERPL CALC-SCNC: 13 MMOL/L (ref 10–20)
AST SERPL W P-5'-P-CCNC: 17 U/L (ref 9–39)
BILIRUB SERPL-MCNC: 0.5 MG/DL (ref 0–1.2)
BUN SERPL-MCNC: 17 MG/DL (ref 6–23)
CALCIUM SERPL-MCNC: 9.6 MG/DL (ref 8.6–10.3)
CHLORIDE SERPL-SCNC: 102 MMOL/L (ref 98–107)
CO2 SERPL-SCNC: 28 MMOL/L (ref 21–32)
CREAT SERPL-MCNC: 0.69 MG/DL (ref 0.5–1.05)
EGFRCR SERPLBLD CKD-EPI 2021: 87 ML/MIN/1.73M*2
GLUCOSE SERPL-MCNC: 101 MG/DL (ref 74–99)
POTASSIUM SERPL-SCNC: 4.4 MMOL/L (ref 3.5–5.3)
PROT SERPL-MCNC: 7.3 G/DL (ref 6.4–8.2)
SODIUM SERPL-SCNC: 139 MMOL/L (ref 136–145)
TSH SERPL-ACNC: 3.23 MIU/L (ref 0.44–3.98)

## 2024-06-12 PROCEDURE — 80053 COMPREHEN METABOLIC PANEL: CPT

## 2024-06-12 PROCEDURE — 36415 COLL VENOUS BLD VENIPUNCTURE: CPT

## 2024-06-12 PROCEDURE — 84443 ASSAY THYROID STIM HORMONE: CPT

## 2024-06-12 PROCEDURE — 83036 HEMOGLOBIN GLYCOSYLATED A1C: CPT

## 2024-06-13 LAB
EST. AVERAGE GLUCOSE BLD GHB EST-MCNC: 128 MG/DL
HBA1C MFR BLD: 6.1 %

## 2024-06-18 ENCOUNTER — APPOINTMENT (OUTPATIENT)
Dept: PRIMARY CARE | Facility: CLINIC | Age: 79
End: 2024-06-18
Payer: MEDICARE

## 2024-06-18 VITALS
WEIGHT: 167 LBS | TEMPERATURE: 97.8 F | OXYGEN SATURATION: 96 % | DIASTOLIC BLOOD PRESSURE: 82 MMHG | HEART RATE: 70 BPM | SYSTOLIC BLOOD PRESSURE: 126 MMHG | BODY MASS INDEX: 30.54 KG/M2

## 2024-06-18 DIAGNOSIS — I10 PRIMARY HYPERTENSION: Chronic | ICD-10-CM

## 2024-06-18 DIAGNOSIS — K90.0 CELIAC DISEASE (HHS-HCC): Chronic | ICD-10-CM

## 2024-06-18 DIAGNOSIS — E03.9 ACQUIRED HYPOTHYROIDISM: Primary | Chronic | ICD-10-CM

## 2024-06-18 DIAGNOSIS — H90.3 SENSORINEURAL HEARING LOSS, BILATERAL: ICD-10-CM

## 2024-06-18 DIAGNOSIS — R73.03 PREDIABETES: ICD-10-CM

## 2024-06-18 DIAGNOSIS — S46.812A STRAIN OF LEFT SUBSCAPULARIS MUSCLE, INITIAL ENCOUNTER: ICD-10-CM

## 2024-06-18 DIAGNOSIS — E78.5 HYPERLIPIDEMIA, UNSPECIFIED HYPERLIPIDEMIA TYPE: ICD-10-CM

## 2024-06-18 PROBLEM — L72.0 MILIA: Status: ACTIVE | Noted: 2024-06-18

## 2024-06-18 PROBLEM — R55 SYNCOPE: Status: RESOLVED | Noted: 2024-02-02 | Resolved: 2024-06-18

## 2024-06-18 PROCEDURE — 3074F SYST BP LT 130 MM HG: CPT | Performed by: FAMILY MEDICINE

## 2024-06-18 PROCEDURE — 3079F DIAST BP 80-89 MM HG: CPT | Performed by: FAMILY MEDICINE

## 2024-06-18 PROCEDURE — 1159F MED LIST DOCD IN RCRD: CPT | Performed by: FAMILY MEDICINE

## 2024-06-18 PROCEDURE — 1036F TOBACCO NON-USER: CPT | Performed by: FAMILY MEDICINE

## 2024-06-18 PROCEDURE — 1160F RVW MEDS BY RX/DR IN RCRD: CPT | Performed by: FAMILY MEDICINE

## 2024-06-18 PROCEDURE — 99214 OFFICE O/P EST MOD 30 MIN: CPT | Performed by: FAMILY MEDICINE

## 2024-06-18 RX ORDER — CHOLECALCIFEROL (VITAMIN D3) 125 MCG
125 CAPSULE ORAL DAILY
COMMUNITY

## 2024-06-18 RX ORDER — ZINC GLUCONATE 50 MG
TABLET ORAL DAILY
COMMUNITY

## 2024-06-18 RX ORDER — LANOLIN ALCOHOL/MO/W.PET/CERES
3000 CREAM (GRAM) TOPICAL DAILY
COMMUNITY

## 2024-06-18 RX ORDER — LEVOTHYROXINE SODIUM 25 UG/1
TABLET ORAL
Qty: 120 TABLET | Refills: 1 | Status: SHIPPED | OUTPATIENT
Start: 2024-06-18

## 2024-06-18 RX ORDER — VIT C/E/ZN/COPPR/LUTEIN/ZEAXAN 250MG-90MG
CAPSULE ORAL
COMMUNITY

## 2024-06-18 RX ORDER — BISMUTH SUBSALICYLATE 262 MG
1 TABLET,CHEWABLE ORAL DAILY
COMMUNITY

## 2024-06-18 ASSESSMENT — ENCOUNTER SYMPTOMS
SLEEP DISTURBANCE: 0
ABDOMINAL PAIN: 0
NERVOUS/ANXIOUS: 0
CHILLS: 0
DIARRHEA: 0
COUGH: 0
FEVER: 0
VOMITING: 0
NAUSEA: 0
SHORTNESS OF BREATH: 0

## 2024-06-18 NOTE — PROGRESS NOTES
Subjective   Patient ID: Christine Atwood is a 79 y.o. female who presents for Hypothyroidism (Review BW).    Christine has been feeling well. Taking increased dose of thyroid medication without side effects. Labs done to reassess.    Her dizziness has improved after tapering off amitriptyline. No adverse effects after stopping medication.     Her anxiety has improved since starting sertraline. Sometimes she takes an extra dose if feeling more anxious than usual; wants to make sure that this is safe.     She has been having intermittent sharp, stabbing pain at left shoulder blade. Lasts a few seconds then resolves once she repositions.     Needs new referral for hearing evaluation.          Review of Systems   Constitutional:  Negative for chills and fever.   Respiratory:  Negative for cough and shortness of breath.    Cardiovascular:  Negative for chest pain.   Gastrointestinal:  Negative for abdominal pain, diarrhea, nausea and vomiting.   Psychiatric/Behavioral:  Negative for sleep disturbance. The patient is not nervous/anxious.        Objective   /82   Pulse 70   Temp 36.6 °C (97.8 °F)   Wt 75.8 kg (167 lb)   SpO2 96%   BMI 30.54 kg/m²     Physical Exam  Constitutional:       General: She is not in acute distress.     Appearance: Normal appearance.   HENT:      Head: Normocephalic.      Mouth/Throat:      Mouth: Mucous membranes are moist.   Eyes:      Extraocular Movements: Extraocular movements intact.      Conjunctiva/sclera: Conjunctivae normal.   Cardiovascular:      Rate and Rhythm: Normal rate and regular rhythm.      Heart sounds: No murmur heard.  Pulmonary:      Breath sounds: No wheezing or rhonchi.   Musculoskeletal:      Cervical back: Neck supple.   Skin:     General: Skin is warm and dry.   Neurological:      Mental Status: She is alert.   Psychiatric:         Mood and Affect: Mood normal.         Behavior: Behavior normal.         Assessment/Plan   Problem List Items Addressed This Visit              ICD-10-CM    Acquired hypothyroidism - Primary (Chronic) E03.9     TSH improved to normal range.          Relevant Medications    levothyroxine (Synthroid, Levoxyl) 25 mcg tablet    Other Relevant Orders    CBC and Auto Differential    Primary hypertension (Chronic) I10     Controlled.          Celiac disease ([facility]) (Chronic) K90.0     Continue gluten avoidance. Check B12 and vitamin D levels with next labs.          Relevant Orders    Vitamin D 25-Hydroxy,Total (for eval of Vitamin D levels)    Vitamin B12    Sensorineural hearing loss, bilateral H90.3    Relevant Orders    Referral to Audiology    Hyperlipidemia E78.5     Check lipid panel with next labs.          Relevant Orders    Lipid Panel    Comprehensive Metabolic Panel    Prediabetes R73.03     New diagnosis 6/2024. Recommend start exercise regimen. Reduce processed foods in the diet.          Relevant Orders    Hemoglobin A1C    Comprehensive Metabolic Panel    CBC and Auto Differential     Other Visit Diagnoses         Codes    Strain of left subscapularis muscle, initial encounter     S46.812A    Reviewed stretch to improve scapular mobility. Consider OMT vs PT if not improving.

## 2024-06-18 NOTE — ASSESSMENT & PLAN NOTE
Improved with sertraline. Discuss that may take an additional dose of sertraline when overly anxious but that it is unlikely to have significant impact. Recommend consider increasing dose to 50mg; patient would like to stay on 25 mg dose for now.

## 2024-07-24 ENCOUNTER — CLINICAL SUPPORT (OUTPATIENT)
Dept: AUDIOLOGY | Facility: HOSPITAL | Age: 79
End: 2024-07-24
Payer: MEDICARE

## 2024-07-24 DIAGNOSIS — H90.3 SENSORINEURAL HEARING LOSS, BILATERAL: ICD-10-CM

## 2024-07-24 PROCEDURE — 92550 TYMPANOMETRY & REFLEX THRESH: CPT | Performed by: AUDIOLOGIST

## 2024-07-24 PROCEDURE — 92557 COMPREHENSIVE HEARING TEST: CPT | Performed by: AUDIOLOGIST

## 2024-07-24 ASSESSMENT — PAIN - FUNCTIONAL ASSESSMENT: PAIN_FUNCTIONAL_ASSESSMENT: 0-10

## 2024-07-24 ASSESSMENT — PAIN SCALES - GENERAL: PAINLEVEL_OUTOF10: 0 - NO PAIN

## 2024-07-24 NOTE — PROGRESS NOTES
AUDIOLOGY ADULT AUDIOMETRIC EVALUATION      Name:  Christine Atwood  :  1945  Age:  79 y.o.  Date of Evaluation:  2024     IMPRESSIONS:  Today's test results are consistent with mild to moderately severe sensorineural hearing loss with good to excellent word discrimination and normal tympanic membrane mobility bilaterally.  Her hearing sensitivity is stable relative to previous test of 3-2-2022.      RECOMMENDATIONS:  -Annual audiologic monitoring, or as changes are noted.  -Daily use and maintenance of hearing aids   -Routine hearing aid check is scheduled for 2024 at 10am.     ------------------------------------------------    HISTORY:  Reason for visit:  Ms. Atwood is seen today at the request of Dorothea Francois DO for an evaluation of hearing.  Patient complains of Hearing Loss  She feels that her hearing is worsening since her last test.  She does struggle with hearing her 's voice, in particular     Previous hearing test:  yes, elsewhere on 3-2-2022, showing mild to moderately severe sensorineural hearing loss with good word discrimination bilaterally   Otalgia:  no  Aural Fullness:  no  Otitis Media:  in childhood, none known in adulthood  PE Tubes:  no  Other otologic surgical history:  no  Tinnitus:   no  Dizziness:  yes, has been treated and feels good now  Noise Exposure: no  Family history of hearing loss:   no  Hearing aid history: yes, longstanding Audeo P90R bilaterally   Other significant history:  history of itching, okay now    EVALUATION    Otoscopic Evaluation:        Both ears:  clear ear canal, tympanic membrane visualized                Immittance Measures: 226Hz       Both ears:  Tympanogram shows normal middle ear pressure, static compliance, and ear canal volume.  Acoustic reflexes were absent.           Pure Tone Audiometry:  Conventional audiometry (125-8000Hz) via insert earphones with good response reliability       Both ears: mild to moderately  severe sensorineural hearing loss          Speech Audiometry:        Right Ear:  Speech Reception Threshold (SRT) was obtained at 40 dBHL                 Word Recognition scores were excellent at 40dBSL re: SRT       Left Ear:  Speech Reception Threshold (SRT) was obtained at 40 dBHL                 Word Recognition scores were good at 40dBSL re: SRT       Binaurally aided word recognition score was excellent at 55dBHL         Distortion Product Otoacoustic Emissions:   Did not test due to equipment error        Brief listening check indicated good function for both hearing aids.    PATIENT EDUCATION:   Discussed results and recommendations with MsJil Rai.  Questions were addressed and the patient was encouraged to contact our department should concerns arise.    Time:  14:52 to 15:26    FRANCESCA Topete, CCC-A  Senior Audiologist

## 2024-07-24 NOTE — LETTER
2024         Dorothea Francois DO  [address]  [address] [address]      Patient: Christine Atwood   YOB: 1945   Date of Visit: 2024       Dear Dorothea Francois DO:    Thank you for referring Christine Atwood to me for evaluation. Below are my notes for this consultation.  If you have questions, please do not hesitate to call me. I look forward to following your patient along with you.       Sincerely,     FRANCESCA Topete, CCC-A  Senior Audiologist      CC:   No Recipients  ______________________________________________________________________________________    AUDIOLOGY ADULT AUDIOMETRIC EVALUATION      Name:  Christine Atwood  :  1945  Age:  79 y.o.  Date of Evaluation:  2024     IMPRESSIONS:  Today's test results are consistent with mild to moderately severe sensorineural hearing loss with good to excellent word discrimination and normal tympanic membrane mobility bilaterally.  Her hearing sensitivity is stable relative to previous test of 3-2-2022.      RECOMMENDATIONS:  -Annual audiologic monitoring, or as changes are noted.  -Daily use and maintenance of hearing aids   -Routine hearing aid check is scheduled for 2024 at 10am.     ------------------------------------------------    HISTORY:  Reason for visit:  Ms. Atwood is seen today at the request of Dorothea Francois DO for an evaluation of hearing.  Patient complains of Hearing Loss  She feels that her hearing is worsening since her last test.  She does struggle with hearing her 's voice, in particular     Previous hearing test:  yes, elsewhere on 3-2-2022, showing mild to moderately severe sensorineural hearing loss with good word discrimination bilaterally   Otalgia:  no  Aural Fullness:  no  Otitis Media:  in childhood, none known in adulthood  PE Tubes:  no  Other otologic surgical history:  no  Tinnitus:   no  Dizziness:  yes, has been treated and feels good now  Noise  Exposure: no  Family history of hearing loss:   no  Hearing aid history: yes, longstanding Audeo P90R bilaterally   Other significant history:  history of itching, okay now    EVALUATION    Otoscopic Evaluation:        Both ears:  clear ear canal, tympanic membrane visualized                Immittance Measures: 226Hz       Both ears:  Tympanogram shows normal middle ear pressure, static compliance, and ear canal volume.  Acoustic reflexes were absent.           Pure Tone Audiometry:  Conventional audiometry (125-8000Hz) via insert earphones with good response reliability       Both ears: mild to moderately severe sensorineural hearing loss          Speech Audiometry:        Right Ear:  Speech Reception Threshold (SRT) was obtained at 40 dBHL                 Word Recognition scores were excellent at 40dBSL re: SRT       Left Ear:  Speech Reception Threshold (SRT) was obtained at 40 dBHL                 Word Recognition scores were good at 40dBSL re: SRT       Binaurally aided word recognition score was excellent at 55dBHL         Distortion Product Otoacoustic Emissions:   Did not test due to equipment error        Brief listening check indicated good function for both hearing aids.    PATIENT EDUCATION:   Discussed results and recommendations with Ms. Atwood.  Questions were addressed and the patient was encouraged to contact our department should concerns arise.    Time:  14:52 to 15:26    FRANCESCA Topete, CCC-A  Senior Audiologist

## 2024-09-10 ENCOUNTER — LAB (OUTPATIENT)
Dept: LAB | Facility: LAB | Age: 79
End: 2024-09-10
Payer: MEDICARE

## 2024-09-10 DIAGNOSIS — R73.03 PREDIABETES: ICD-10-CM

## 2024-09-10 DIAGNOSIS — E03.9 ACQUIRED HYPOTHYROIDISM: Chronic | ICD-10-CM

## 2024-09-10 DIAGNOSIS — E78.5 HYPERLIPIDEMIA, UNSPECIFIED HYPERLIPIDEMIA TYPE: ICD-10-CM

## 2024-09-10 DIAGNOSIS — K90.0 CELIAC DISEASE (HHS-HCC): Chronic | ICD-10-CM

## 2024-09-10 LAB
25(OH)D3 SERPL-MCNC: 75 NG/ML (ref 30–100)
ALBUMIN SERPL BCP-MCNC: 4 G/DL (ref 3.4–5)
ALP SERPL-CCNC: 64 U/L (ref 33–136)
ALT SERPL W P-5'-P-CCNC: 14 U/L (ref 7–45)
ANION GAP SERPL CALC-SCNC: 13 MMOL/L (ref 10–20)
AST SERPL W P-5'-P-CCNC: 13 U/L (ref 9–39)
BASOPHILS # BLD AUTO: 0.06 X10*3/UL (ref 0–0.1)
BASOPHILS NFR BLD AUTO: 0.9 %
BILIRUB SERPL-MCNC: 0.4 MG/DL (ref 0–1.2)
BUN SERPL-MCNC: 15 MG/DL (ref 6–23)
CALCIUM SERPL-MCNC: 9 MG/DL (ref 8.6–10.3)
CHLORIDE SERPL-SCNC: 103 MMOL/L (ref 98–107)
CHOLEST SERPL-MCNC: 266 MG/DL (ref 0–199)
CHOLESTEROL/HDL RATIO: 5.7
CO2 SERPL-SCNC: 26 MMOL/L (ref 21–32)
CREAT SERPL-MCNC: 0.77 MG/DL (ref 0.5–1.05)
EGFRCR SERPLBLD CKD-EPI 2021: 78 ML/MIN/1.73M*2
EOSINOPHIL # BLD AUTO: 0.23 X10*3/UL (ref 0–0.4)
EOSINOPHIL NFR BLD AUTO: 3.3 %
ERYTHROCYTE [DISTWIDTH] IN BLOOD BY AUTOMATED COUNT: 14.3 % (ref 11.5–14.5)
EST. AVERAGE GLUCOSE BLD GHB EST-MCNC: 134 MG/DL
GLUCOSE SERPL-MCNC: 104 MG/DL (ref 74–99)
HBA1C MFR BLD: 6.3 %
HCT VFR BLD AUTO: 41.1 % (ref 36–46)
HDLC SERPL-MCNC: 46.8 MG/DL
HGB BLD-MCNC: 13.4 G/DL (ref 12–16)
IMM GRANULOCYTES # BLD AUTO: 0.01 X10*3/UL (ref 0–0.5)
IMM GRANULOCYTES NFR BLD AUTO: 0.1 % (ref 0–0.9)
LDLC SERPL CALC-MCNC: 148 MG/DL
LYMPHOCYTES # BLD AUTO: 2.92 X10*3/UL (ref 0.8–3)
LYMPHOCYTES NFR BLD AUTO: 42.1 %
MCH RBC QN AUTO: 30.2 PG (ref 26–34)
MCHC RBC AUTO-ENTMCNC: 32.6 G/DL (ref 32–36)
MCV RBC AUTO: 93 FL (ref 80–100)
MONOCYTES # BLD AUTO: 0.44 X10*3/UL (ref 0.05–0.8)
MONOCYTES NFR BLD AUTO: 6.3 %
NEUTROPHILS # BLD AUTO: 3.28 X10*3/UL (ref 1.6–5.5)
NEUTROPHILS NFR BLD AUTO: 47.3 %
NON HDL CHOLESTEROL: 219 MG/DL (ref 0–149)
NRBC BLD-RTO: 0 /100 WBCS (ref 0–0)
PLATELET # BLD AUTO: 282 X10*3/UL (ref 150–450)
POTASSIUM SERPL-SCNC: 3.9 MMOL/L (ref 3.5–5.3)
PROT SERPL-MCNC: 7.1 G/DL (ref 6.4–8.2)
RBC # BLD AUTO: 4.43 X10*6/UL (ref 4–5.2)
SODIUM SERPL-SCNC: 138 MMOL/L (ref 136–145)
TRIGL SERPL-MCNC: 358 MG/DL (ref 0–149)
VIT B12 SERPL-MCNC: 2090 PG/ML (ref 211–911)
VLDL: 72 MG/DL (ref 0–40)
WBC # BLD AUTO: 6.9 X10*3/UL (ref 4.4–11.3)

## 2024-09-10 PROCEDURE — 80061 LIPID PANEL: CPT

## 2024-09-10 PROCEDURE — 83036 HEMOGLOBIN GLYCOSYLATED A1C: CPT

## 2024-09-10 PROCEDURE — 85025 COMPLETE CBC W/AUTO DIFF WBC: CPT

## 2024-09-10 PROCEDURE — 82607 VITAMIN B-12: CPT

## 2024-09-10 PROCEDURE — 82306 VITAMIN D 25 HYDROXY: CPT

## 2024-09-10 PROCEDURE — 80053 COMPREHEN METABOLIC PANEL: CPT

## 2024-09-10 PROCEDURE — 36415 COLL VENOUS BLD VENIPUNCTURE: CPT

## 2024-09-17 ENCOUNTER — APPOINTMENT (OUTPATIENT)
Dept: PRIMARY CARE | Facility: CLINIC | Age: 79
End: 2024-09-17
Payer: MEDICARE

## 2024-09-17 VITALS
WEIGHT: 170 LBS | HEIGHT: 62 IN | TEMPERATURE: 97.8 F | DIASTOLIC BLOOD PRESSURE: 60 MMHG | HEART RATE: 67 BPM | SYSTOLIC BLOOD PRESSURE: 130 MMHG | OXYGEN SATURATION: 98 % | BODY MASS INDEX: 31.28 KG/M2

## 2024-09-17 DIAGNOSIS — F41.9 ANXIETY: ICD-10-CM

## 2024-09-17 DIAGNOSIS — K21.9 GASTROESOPHAGEAL REFLUX DISEASE WITHOUT ESOPHAGITIS: Chronic | ICD-10-CM

## 2024-09-17 DIAGNOSIS — J30.9 ALLERGIC RHINITIS, UNSPECIFIED SEASONALITY, UNSPECIFIED TRIGGER: Chronic | ICD-10-CM

## 2024-09-17 DIAGNOSIS — R25.2 LEG CRAMPS: ICD-10-CM

## 2024-09-17 DIAGNOSIS — I10 PRIMARY HYPERTENSION: Chronic | ICD-10-CM

## 2024-09-17 DIAGNOSIS — Z78.9 FULL CODE STATUS: ICD-10-CM

## 2024-09-17 DIAGNOSIS — Z78.0 POSTMENOPAUSAL STATUS (AGE-RELATED) (NATURAL): ICD-10-CM

## 2024-09-17 DIAGNOSIS — R73.03 PREDIABETES: ICD-10-CM

## 2024-09-17 DIAGNOSIS — Z23 NEEDS FLU SHOT: ICD-10-CM

## 2024-09-17 DIAGNOSIS — E78.5 HYPERLIPIDEMIA, UNSPECIFIED HYPERLIPIDEMIA TYPE: ICD-10-CM

## 2024-09-17 DIAGNOSIS — R74.8 ELEVATED VITAMIN B12 LEVEL: ICD-10-CM

## 2024-09-17 DIAGNOSIS — H53.9 VISION CHANGES: ICD-10-CM

## 2024-09-17 DIAGNOSIS — Z00.00 ROUTINE GENERAL MEDICAL EXAMINATION AT HEALTH CARE FACILITY: Primary | ICD-10-CM

## 2024-09-17 DIAGNOSIS — R35.0 URINARY FREQUENCY: ICD-10-CM

## 2024-09-17 DIAGNOSIS — E03.9 ACQUIRED HYPOTHYROIDISM: Chronic | ICD-10-CM

## 2024-09-17 DIAGNOSIS — G47.09 OTHER INSOMNIA: ICD-10-CM

## 2024-09-17 LAB
POC APPEARANCE, URINE: CLEAR
POC BILIRUBIN, URINE: NEGATIVE
POC BLOOD, URINE: NEGATIVE
POC COLOR, URINE: YELLOW
POC GLUCOSE, URINE: NEGATIVE MG/DL
POC KETONES, URINE: NEGATIVE MG/DL
POC LEUKOCYTES, URINE: ABNORMAL
POC NITRITE,URINE: NEGATIVE
POC PH, URINE: 6 PH
POC PROTEIN, URINE: NEGATIVE MG/DL
POC SPECIFIC GRAVITY, URINE: 1.01
POC UROBILINOGEN, URINE: 0.2 EU/DL

## 2024-09-17 PROCEDURE — 1158F ADVNC CARE PLAN TLK DOCD: CPT | Performed by: FAMILY MEDICINE

## 2024-09-17 PROCEDURE — G0439 PPPS, SUBSEQ VISIT: HCPCS | Performed by: FAMILY MEDICINE

## 2024-09-17 PROCEDURE — 1123F ACP DISCUSS/DSCN MKR DOCD: CPT | Performed by: FAMILY MEDICINE

## 2024-09-17 PROCEDURE — 99215 OFFICE O/P EST HI 40 MIN: CPT | Performed by: FAMILY MEDICINE

## 2024-09-17 PROCEDURE — 1036F TOBACCO NON-USER: CPT | Performed by: FAMILY MEDICINE

## 2024-09-17 PROCEDURE — 3078F DIAST BP <80 MM HG: CPT | Performed by: FAMILY MEDICINE

## 2024-09-17 PROCEDURE — 81003 URINALYSIS AUTO W/O SCOPE: CPT | Performed by: FAMILY MEDICINE

## 2024-09-17 PROCEDURE — 87086 URINE CULTURE/COLONY COUNT: CPT

## 2024-09-17 PROCEDURE — 90662 IIV NO PRSV INCREASED AG IM: CPT | Performed by: FAMILY MEDICINE

## 2024-09-17 PROCEDURE — 3075F SYST BP GE 130 - 139MM HG: CPT | Performed by: FAMILY MEDICINE

## 2024-09-17 PROCEDURE — G0008 ADMIN INFLUENZA VIRUS VAC: HCPCS | Performed by: FAMILY MEDICINE

## 2024-09-17 PROCEDURE — 1159F MED LIST DOCD IN RCRD: CPT | Performed by: FAMILY MEDICINE

## 2024-09-17 PROCEDURE — 1170F FXNL STATUS ASSESSED: CPT | Performed by: FAMILY MEDICINE

## 2024-09-17 RX ORDER — AMITRIPTYLINE HYDROCHLORIDE 25 MG/1
TABLET, FILM COATED ORAL
COMMUNITY
Start: 2024-08-18

## 2024-09-17 RX ORDER — MONTELUKAST SODIUM 10 MG/1
10 TABLET ORAL NIGHTLY
Qty: 90 TABLET | Refills: 1 | Status: SHIPPED | OUTPATIENT
Start: 2024-09-17

## 2024-09-17 RX ORDER — LEVOTHYROXINE SODIUM 25 UG/1
TABLET ORAL
Qty: 120 TABLET | Refills: 1 | Status: SHIPPED | OUTPATIENT
Start: 2024-09-17

## 2024-09-17 RX ORDER — AMLODIPINE BESYLATE 5 MG/1
5 TABLET ORAL DAILY
Qty: 90 TABLET | Refills: 1 | Status: SHIPPED | OUTPATIENT
Start: 2024-09-17

## 2024-09-17 RX ORDER — OMEPRAZOLE 40 MG/1
40 CAPSULE, DELAYED RELEASE ORAL DAILY
Qty: 90 CAPSULE | Refills: 1 | Status: SHIPPED | OUTPATIENT
Start: 2024-09-17

## 2024-09-17 RX ORDER — MULTIVIT-MIN/IRON FUM/FOLIC AC 7.5 MG-4
1 TABLET ORAL DAILY
COMMUNITY

## 2024-09-17 RX ORDER — SERTRALINE HYDROCHLORIDE 25 MG/1
25 TABLET, FILM COATED ORAL DAILY
Qty: 90 TABLET | Refills: 1 | Status: SHIPPED | OUTPATIENT
Start: 2024-09-17

## 2024-09-17 ASSESSMENT — ENCOUNTER SYMPTOMS
NAUSEA: 0
DYSURIA: 0
FREQUENCY: 1
ABDOMINAL PAIN: 0
DIARRHEA: 0
VOMITING: 0
COUGH: 0
FEVER: 0
DIZZINESS: 0
CHILLS: 0
VOICE CHANGE: 0
SORE THROAT: 0
RHINORRHEA: 0
CONSTIPATION: 0
FATIGUE: 0
SHORTNESS OF BREATH: 0
APPETITE CHANGE: 0
SLEEP DISTURBANCE: 0
PALPITATIONS: 0
HEMATURIA: 0

## 2024-09-17 ASSESSMENT — ACTIVITIES OF DAILY LIVING (ADL)
DRESSING: INDEPENDENT
MANAGING_FINANCES: INDEPENDENT
DOING_HOUSEWORK: INDEPENDENT
BATHING: INDEPENDENT
GROCERY_SHOPPING: INDEPENDENT
TAKING_MEDICATION: INDEPENDENT

## 2024-09-17 ASSESSMENT — PATIENT HEALTH QUESTIONNAIRE - PHQ9
2. FEELING DOWN, DEPRESSED OR HOPELESS: NOT AT ALL
1. LITTLE INTEREST OR PLEASURE IN DOING THINGS: NOT AT ALL
SUM OF ALL RESPONSES TO PHQ9 QUESTIONS 1 AND 2: 0

## 2024-09-17 NOTE — PROGRESS NOTES
Subjective   Reason for Visit: Christine Atwood is an 79 y.o. female here for a Medicare Wellness visit.     Past Medical, Surgical, and Family History reviewed and updated in chart.    Reviewed all medications by prescribing practitioner or clinical pharmacist (such as prescriptions, OTCs, herbal therapies and supplements) and documented in the medical record.    Christine has been noticing urinary frequency.  She goes many times throughout the day.  She has not had any burning or blood in the urine.  No flank pain or fevers.    She also has been having difficulty sleeping.  Often wakes up multiple times at night.  Snores occasionally.  Does feel well rested on awakening.  Does not require a nap during the day.  She did try using chamomile tea which helped somewhat.    She has noticed a decline in her vision and is requesting to see an eye doctor for change in her prescription.    Lately she has been trying to go for walks with her .  She often gets legs cramps during walking.  No burning pain in her legs.  Cramps last for a while after she stops moving.  Wants to know what she can do to help with this symptom.        Patient Care Team:  Dorothea Francois DO as PCP - General (Family Medicine)  Amilcar Workman MD as Consulting Physician (Cardiology)     Review of Systems   Constitutional:  Negative for appetite change, chills, fatigue and fever.   HENT:  Negative for congestion, rhinorrhea, sore throat and voice change.    Eyes:  Negative for visual disturbance.   Respiratory:  Negative for cough and shortness of breath.    Cardiovascular:  Negative for chest pain, palpitations and leg swelling.   Gastrointestinal:  Negative for abdominal pain, constipation, diarrhea, nausea and vomiting.   Genitourinary:  Positive for frequency. Negative for dysuria, hematuria and urgency.   Skin:  Negative for rash.   Neurological:  Negative for dizziness.   Psychiatric/Behavioral:  Negative for sleep  "disturbance.        Objective   Vitals:  /60   Pulse 67   Temp 36.6 °C (97.8 °F)   Ht 1.58 m (5' 2.21\")   Wt 77.1 kg (170 lb)   SpO2 98%   BMI 30.89 kg/m²       Physical Exam  Constitutional:       General: She is not in acute distress.  HENT:      Head: Normocephalic and atraumatic.      Right Ear: Tympanic membrane normal.      Left Ear: Tympanic membrane normal.      Nose: No congestion or rhinorrhea.      Mouth/Throat:      Mouth: Mucous membranes are moist.      Pharynx: No oropharyngeal exudate or posterior oropharyngeal erythema.   Eyes:      Extraocular Movements: Extraocular movements intact.   Cardiovascular:      Rate and Rhythm: Normal rate and regular rhythm.      Heart sounds: No murmur heard.  Pulmonary:      Effort: Pulmonary effort is normal.      Breath sounds: No wheezing or rhonchi.   Abdominal:      Palpations: Abdomen is soft.   Musculoskeletal:         General: No swelling or tenderness.      Cervical back: Neck supple.      Right lower leg: No edema.      Left lower leg: No edema.   Lymphadenopathy:      Cervical: No cervical adenopathy.   Skin:     General: Skin is warm and dry.   Neurological:      Mental Status: She is alert.      Cranial Nerves: No cranial nerve deficit.      Motor: No weakness.      Coordination: Coordination normal.      Gait: Gait normal.   Psychiatric:         Mood and Affect: Mood normal.         Behavior: Behavior normal.         Assessment & Plan  Routine general medical examination at health care facility  Medicare AWE performed.  High-dose flu vaccine given.  Recommend RSV vaccine and COVID booster.       Leg cramps  Advised stretching before exercise.  If symptoms persist, consider URBANO to evaluate for peripheral vascular disease.       Other insomnia  Discussed medication options, but patient prefers staff medicine at this time.  Recommend start exercising for 30 minutes/day to help her sleep better at night.       Urinary frequency  Trace leukocytes " seen on urine test.  Urine sent for culture.  Orders:    POCT UA Automated manually resulted    Urine Culture; Future    Urine Culture    Elevated vitamin B12 level  B12 level elevated.  Stop B12 supplement.    Orders:    Vitamin B12; Future    Prediabetes  Continue lifestyle changes.  Orders:    CBC and Auto Differential; Future    Comprehensive Metabolic Panel; Future    Hemoglobin A1C; Future    Primary hypertension  Controlled.  Orders:    amLODIPine (Norvasc) 5 mg tablet; Take 1 tablet (5 mg) by mouth once daily.     Hyperlipidemia, unspecified hyperlipidemia type  LDL elevated.  Discussed diet changes.  Orders:    Lipid Panel; Future    Acquired hypothyroidism  TSH stable.  Orders:    levothyroxine (Synthroid, Levoxyl) 25 mcg tablet; Take 1 tab PO daily except 1.5 tablets on Sundays.    Thyroid Stimulating Hormone; Future    Allergic rhinitis, unspecified seasonality, unspecified trigger    Orders:    montelukast (Singulair) 10 mg tablet; Take 1 tablet (10 mg) by mouth once daily at bedtime.    Gastroesophageal reflux disease without esophagitis    Orders:    omeprazole (PriLOSEC) 40 mg DR capsule; Take 1 capsule (40 mg) by mouth once daily. Do not crush or chew.    Anxiety    Orders:    sertraline (Zoloft) 25 mg tablet; Take 1 tablet (25 mg) by mouth once daily.    Postmenopausal status (age-related) (natural)    Orders:    XR DEXA bone density; Future    Vision changes    Orders:    Referral to Ophthalmology; Future    Needs flu shot    Orders:    Flu vaccine, trivalent, preservative free, HIGH-DOSE, age 65y+ (Fluzone)    Full code status    Orders:    Full code      Time Based Billing:  - Prep Time on Date of Patient Encounter:  5 minutes  - Time Directly with Patient/Family/Caregiver:   32 minutes  - Documentation Time:   9 minutes    Total Minutes:  46

## 2024-09-17 NOTE — ASSESSMENT & PLAN NOTE
Continue lifestyle changes.  Orders:    CBC and Auto Differential; Future    Comprehensive Metabolic Panel; Future    Hemoglobin A1C; Future

## 2024-09-17 NOTE — ASSESSMENT & PLAN NOTE
Discussed medication options, but patient prefers staff medicine at this time.  Recommend start exercising for 30 minutes/day to help her sleep better at night.

## 2024-09-17 NOTE — ASSESSMENT & PLAN NOTE
Controlled.  Orders:    amLODIPine (Norvasc) 5 mg tablet; Take 1 tablet (5 mg) by mouth once daily.

## 2024-09-17 NOTE — ASSESSMENT & PLAN NOTE
Orders:    montelukast (Singulair) 10 mg tablet; Take 1 tablet (10 mg) by mouth once daily at bedtime.

## 2024-09-17 NOTE — ASSESSMENT & PLAN NOTE
TSH stable.  Orders:    levothyroxine (Synthroid, Levoxyl) 25 mcg tablet; Take 1 tab PO daily except 1.5 tablets on Sundays.    Thyroid Stimulating Hormone; Future

## 2024-09-17 NOTE — PATIENT INSTRUCTIONS
Max dose of Tylenol is 3000mg per day.    Go to your pharmacy for the RSV vaccine and COVID booster.    Recommend stretching your calves before exercise.     Try to exercise at least 30 minutes per day to help with sleep.

## 2024-09-17 NOTE — ASSESSMENT & PLAN NOTE
Orders:    omeprazole (PriLOSEC) 40 mg DR capsule; Take 1 capsule (40 mg) by mouth once daily. Do not crush or chew.

## 2024-09-18 ENCOUNTER — HOSPITAL ENCOUNTER (OUTPATIENT)
Dept: RADIOLOGY | Facility: CLINIC | Age: 79
Discharge: HOME | End: 2024-09-18
Payer: MEDICARE

## 2024-09-18 DIAGNOSIS — Z78.0 POSTMENOPAUSAL STATUS (AGE-RELATED) (NATURAL): ICD-10-CM

## 2024-09-18 PROCEDURE — 77080 DXA BONE DENSITY AXIAL: CPT | Performed by: RADIOLOGY

## 2024-09-18 PROCEDURE — 77080 DXA BONE DENSITY AXIAL: CPT

## 2024-09-19 ENCOUNTER — HOSPITAL ENCOUNTER (OUTPATIENT)
Dept: CARDIOLOGY | Facility: CLINIC | Age: 79
Discharge: HOME | End: 2024-09-19
Payer: MEDICARE

## 2024-09-19 DIAGNOSIS — R55 SYNCOPE AND COLLAPSE: ICD-10-CM

## 2024-09-19 LAB — BACTERIA UR CULT: NORMAL

## 2024-10-03 ENCOUNTER — HOSPITAL ENCOUNTER (OUTPATIENT)
Dept: CARDIOLOGY | Facility: CLINIC | Age: 79
Discharge: HOME | End: 2024-10-03
Payer: MEDICARE

## 2024-10-03 DIAGNOSIS — R55 SYNCOPE AND COLLAPSE: ICD-10-CM

## 2024-10-04 ENCOUNTER — HOSPITAL ENCOUNTER (OUTPATIENT)
Dept: CARDIOLOGY | Facility: CLINIC | Age: 79
Discharge: HOME | End: 2024-10-04
Payer: MEDICARE

## 2024-10-04 DIAGNOSIS — R55 SYNCOPE AND COLLAPSE: ICD-10-CM

## 2024-10-11 ENCOUNTER — HOSPITAL ENCOUNTER (OUTPATIENT)
Dept: CARDIOLOGY | Facility: CLINIC | Age: 79
Discharge: HOME | End: 2024-10-11
Payer: MEDICARE

## 2024-10-11 DIAGNOSIS — R55 SYNCOPE AND COLLAPSE: ICD-10-CM

## 2024-10-14 ENCOUNTER — HOSPITAL ENCOUNTER (OUTPATIENT)
Dept: CARDIOLOGY | Facility: CLINIC | Age: 79
Discharge: HOME | End: 2024-10-14
Payer: MEDICARE

## 2024-10-14 DIAGNOSIS — R55 SYNCOPE AND COLLAPSE: ICD-10-CM

## 2024-10-21 ENCOUNTER — HOSPITAL ENCOUNTER (OUTPATIENT)
Dept: CARDIOLOGY | Facility: CLINIC | Age: 79
Discharge: HOME | End: 2024-10-21
Payer: MEDICARE

## 2024-10-21 DIAGNOSIS — R55 SYNCOPE AND COLLAPSE: ICD-10-CM

## 2024-11-04 ENCOUNTER — HOSPITAL ENCOUNTER (OUTPATIENT)
Dept: CARDIOLOGY | Facility: CLINIC | Age: 79
Discharge: HOME | End: 2024-11-04
Payer: MEDICARE

## 2024-11-04 DIAGNOSIS — R55 SYNCOPE AND COLLAPSE: ICD-10-CM

## 2024-11-08 ENCOUNTER — PATIENT MESSAGE (OUTPATIENT)
Dept: PRIMARY CARE | Facility: CLINIC | Age: 79
End: 2024-11-08
Payer: MEDICARE

## 2024-11-08 DIAGNOSIS — R23.9 UNSPECIFIED SKIN CHANGES: Primary | ICD-10-CM

## 2024-11-14 ENCOUNTER — APPOINTMENT (OUTPATIENT)
Dept: AUDIOLOGY | Facility: HOSPITAL | Age: 79
End: 2024-11-14
Payer: MEDICARE

## 2024-11-21 ENCOUNTER — HOSPITAL ENCOUNTER (OUTPATIENT)
Dept: CARDIOLOGY | Facility: CLINIC | Age: 79
Discharge: HOME | End: 2024-11-21
Payer: MEDICARE

## 2024-11-21 DIAGNOSIS — R55 SYNCOPE AND COLLAPSE: ICD-10-CM

## 2024-11-25 ENCOUNTER — CLINICAL SUPPORT (OUTPATIENT)
Dept: AUDIOLOGY | Facility: HOSPITAL | Age: 79
End: 2024-11-25
Payer: MEDICARE

## 2024-11-25 DIAGNOSIS — H90.3 SENSORINEURAL HEARING LOSS, BILATERAL: Primary | ICD-10-CM

## 2024-11-25 PROCEDURE — V5299 HEARING SERVICE: HCPCS | Mod: AUDSP | Performed by: AUDIOLOGIST

## 2024-11-25 ASSESSMENT — PAIN SCALES - GENERAL: PAINLEVEL_OUTOF10: 0 - NO PAIN

## 2024-11-25 ASSESSMENT — PAIN - FUNCTIONAL ASSESSMENT: PAIN_FUNCTIONAL_ASSESSMENT: 0-10

## 2024-11-25 NOTE — PROGRESS NOTES
AUDIOLOGY HEARING AID CHECK      Name:  Christine Atwood  :  1945  Age:  79 y.o.  Date of Appointment:  2024     History:  Ms. Atwood is seen today for Hearing Aid Check     She reports that the left hearing aid is dead, and has not been holding the charge at home.      Current hearing aids:  Hearing Aids:  Phonak Audeo M50R  Right # [id number]    Left # [id number]  Speakers: #0M (4.0)   Domes: medium vented    [place]  Repair/loss warranty ends:   Last hearing evaluation: 2024    Upon initial examination, the left hearing aid was dead and the right one was weak.   Hearing aid charge read at:       Cleaned and checked both devices.    Replaced filters bilaterally.  Replaced domes bilaterally.   Cleaned battery contacts here, and gave alcohol pad to clean contacts on  at home.      Both hearing aids sound and look good on final examination.      Otoscopic observation revealed mostly clear ear canals bilaterally.    Per data logging, this patient wears the hearing aids 10-11 hours / day on average.      IMPRESSIONS:  Both hearing aids function appropriately following today's routine maintenance procedures.  The patient also reports the aids sound good.    Factory repairs or battery replacement can be performed, but can be expensive.  Her current  and cerumen protection systems will be discontinued at the end of this year.      RECOMMENDATIONS:  -Daily use of hearing aids.  -Daily maintenance to be performed at home.   -Follow-up hearing aid check in 6-12 months or as needed.  Appointment is scheduled on May 29, 2025 at 11am.   -Patient to consider replacement of hearing aids if any changes or problems are noticed going forward, as the current devices are near their end of life.     PATIENT EDUCATION:   Discussed above information with Ms. Atwood.  Questions were addressed and the patient was encouraged to contact our department should  concerns arise.    Time:  10:00 to 10:25    FRANCESCA Topete, CCC-A  Senior Audiologist

## 2024-12-20 ENCOUNTER — PATIENT MESSAGE (OUTPATIENT)
Dept: PRIMARY CARE | Facility: CLINIC | Age: 79
End: 2024-12-20
Payer: MEDICARE

## 2024-12-20 DIAGNOSIS — J30.9 ALLERGIC RHINITIS, UNSPECIFIED SEASONALITY, UNSPECIFIED TRIGGER: Chronic | ICD-10-CM

## 2024-12-20 RX ORDER — CETIRIZINE HYDROCHLORIDE 10 MG/1
10 TABLET ORAL DAILY
Qty: 90 TABLET | Refills: 1 | Status: SHIPPED | OUTPATIENT
Start: 2024-12-20

## 2025-02-03 ENCOUNTER — TELEPHONE (OUTPATIENT)
Dept: AUDIOLOGY | Facility: HOSPITAL | Age: 80
End: 2025-02-03
Payer: MEDICARE

## 2025-02-03 NOTE — TELEPHONE ENCOUNTER
Christine Atwood called to report that cleaning the battery contacts did not fix her charging problem.  I recommended that she try a hard reset (to hold the volume button for 20 seconds, then place the device in the ).  Now the charging indicator light is coming on per her report.  I recommended that she let it charge again overnight and let me know if she has problems again.      As the hearing aids are over 5 years old now, the batteries and the aids are near the end of useful life.  When she is ready to consider purchase of new hearing aids, she will request a referral for hearing test from her physician.  After the test, we can proceed with new hearing aid consultation.      The patient demonstrates understanding and agrees with recommendations.      FRANCESCA Topete, CCC-A   Senior Audiologist    Time: 15:15 to 15:20

## 2025-02-10 DIAGNOSIS — I10 PRIMARY HYPERTENSION: Chronic | ICD-10-CM

## 2025-02-10 DIAGNOSIS — F41.9 ANXIETY: ICD-10-CM

## 2025-02-10 RX ORDER — SERTRALINE HYDROCHLORIDE 25 MG/1
25 TABLET, FILM COATED ORAL DAILY
Qty: 90 TABLET | Refills: 0 | Status: SHIPPED | OUTPATIENT
Start: 2025-02-10

## 2025-02-10 RX ORDER — AMLODIPINE BESYLATE 5 MG/1
5 TABLET ORAL DAILY
Qty: 90 TABLET | Refills: 0 | Status: SHIPPED | OUTPATIENT
Start: 2025-02-10

## 2025-02-11 ENCOUNTER — APPOINTMENT (OUTPATIENT)
Dept: OPHTHALMOLOGY | Facility: CLINIC | Age: 80
End: 2025-02-11
Payer: MEDICARE

## 2025-02-11 DIAGNOSIS — R73.03 PREDIABETES: ICD-10-CM

## 2025-02-11 DIAGNOSIS — H53.15 VISUAL DISTORTIONS OF SHAPE AND SIZE: ICD-10-CM

## 2025-02-11 DIAGNOSIS — H25.813 COMBINED FORMS OF AGE-RELATED CATARACT OF BOTH EYES: Primary | ICD-10-CM

## 2025-02-11 DIAGNOSIS — H53.9 VISION CHANGES: ICD-10-CM

## 2025-02-11 PROCEDURE — 92134 CPTRZ OPH DX IMG PST SGM RTA: CPT | Performed by: OPHTHALMOLOGY

## 2025-02-11 PROCEDURE — 92004 COMPRE OPH EXAM NEW PT 1/>: CPT | Performed by: OPHTHALMOLOGY

## 2025-02-11 ASSESSMENT — CUP TO DISC RATIO
OD_RATIO: 0.3
OS_RATIO: 0.2

## 2025-02-11 ASSESSMENT — VISUAL ACUITY
OS_CC+: -2
OS_CC: 20/40
OD_BAT_MED: 20/40
OS_BAT_MED: 20/70
OD_CC: 20/50
OD_CC+: +2
CORRECTION_TYPE: GLASSES
METHOD: SNELLEN - LINEAR

## 2025-02-11 ASSESSMENT — REFRACTION_MANIFEST
OD_AXIS: 095
OS_AXIS: 091
OD_ADD: +2.75
OD_AXIS: 100
METHOD_AUTOREFRACTION: 1
OS_CYLINDER: -1.00
OS_SPHERE: -1.00
OD_SPHERE: -0.25
OD_CYLINDER: -2.75
OS_SPHERE: -1.00
OS_CYLINDER: -1.00
OD_SPHERE: -0.25
OS_ADD: +2.75
OS_AXIS: 090
OD_CYLINDER: -2.75

## 2025-02-11 ASSESSMENT — PACHYMETRY
OD_CT(UM): 585
OS_CT(UM): 596

## 2025-02-11 ASSESSMENT — REFRACTION_WEARINGRX
OS_AXIS: 127
OD_AXIS: 081
OS_SPHERE: -2.75
OD_SPHERE: -1.50
OD_CYLINDER: -1.25
OS_ADD: +2.00
OD_ADD: +2.00
OS_CYLINDER: -0.50

## 2025-02-11 ASSESSMENT — ENCOUNTER SYMPTOMS
PSYCHIATRIC NEGATIVE: 0
NEUROLOGICAL NEGATIVE: 0
HEMATOLOGIC/LYMPHATIC NEGATIVE: 0
ENDOCRINE NEGATIVE: 0
RESPIRATORY NEGATIVE: 0
GASTROINTESTINAL NEGATIVE: 0
EYES NEGATIVE: 1
CONSTITUTIONAL NEGATIVE: 0
CARDIOVASCULAR NEGATIVE: 0
MUSCULOSKELETAL NEGATIVE: 0
ALLERGIC/IMMUNOLOGIC NEGATIVE: 0

## 2025-02-11 ASSESSMENT — TONOMETRY
OD_IOP_MMHG: 20
OS_IOP_MMHG: 19
IOP_METHOD: GOLDMANN APPLANATION

## 2025-02-11 ASSESSMENT — CONF VISUAL FIELD
OS_INFERIOR_TEMPORAL_RESTRICTION: 0
OD_INFERIOR_NASAL_RESTRICTION: 0
OD_SUPERIOR_TEMPORAL_RESTRICTION: 0
OD_INFERIOR_TEMPORAL_RESTRICTION: 0
OD_NORMAL: 1
OD_SUPERIOR_NASAL_RESTRICTION: 0
OS_SUPERIOR_NASAL_RESTRICTION: 0
OS_SUPERIOR_TEMPORAL_RESTRICTION: 0
OS_NORMAL: 1
OS_INFERIOR_NASAL_RESTRICTION: 0

## 2025-02-11 ASSESSMENT — SLIT LAMP EXAM - LIDS
COMMENTS: NORMAL
COMMENTS: NORMAL

## 2025-02-11 ASSESSMENT — EXTERNAL EXAM - RIGHT EYE: OD_EXAM: NORMAL

## 2025-02-11 ASSESSMENT — EXTERNAL EXAM - LEFT EYE: OS_EXAM: NORMAL

## 2025-02-11 NOTE — PROGRESS NOTES
First visit  Moved from [place]    Prediabetic  Recent A1c 6.3, monitored off meds  No retinopathy  OCT macula WNL OU  OCT RNFL WNL  monitor      2. 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 Myopia  new glasses for distance only, prefers to take it out to read    Fu 1 year

## 2025-02-14 ENCOUNTER — HOSPITAL ENCOUNTER (OUTPATIENT)
Dept: CARDIOLOGY | Facility: CLINIC | Age: 80
Discharge: HOME | End: 2025-02-14
Payer: MEDICARE

## 2025-02-14 DIAGNOSIS — R55 SYNCOPE AND COLLAPSE: ICD-10-CM

## 2025-02-14 PROCEDURE — 93298 REM INTERROG DEV EVAL SCRMS: CPT

## 2025-03-11 LAB
ALBUMIN SERPL-MCNC: 4.2 G/DL (ref 3.6–5.1)
ALP SERPL-CCNC: 59 U/L (ref 37–153)
ALT SERPL-CCNC: 16 U/L (ref 6–29)
ANION GAP SERPL CALCULATED.4IONS-SCNC: 15 MMOL/L (CALC) (ref 7–17)
AST SERPL-CCNC: 15 U/L (ref 10–35)
BASOPHILS # BLD AUTO: 59 CELLS/UL (ref 0–200)
BASOPHILS NFR BLD AUTO: 0.9 %
BILIRUB SERPL-MCNC: 0.5 MG/DL (ref 0.2–1.2)
BUN SERPL-MCNC: 16 MG/DL (ref 7–25)
CALCIUM SERPL-MCNC: 9.5 MG/DL (ref 8.6–10.4)
CHLORIDE SERPL-SCNC: 105 MMOL/L (ref 98–110)
CHOLEST SERPL-MCNC: 272 MG/DL
CHOLEST/HDLC SERPL: 5.7 (CALC)
CO2 SERPL-SCNC: 22 MMOL/L (ref 20–32)
CREAT SERPL-MCNC: 0.86 MG/DL (ref 0.6–0.95)
EGFRCR SERPLBLD CKD-EPI 2021: 68 ML/MIN/1.73M2
EOSINOPHIL # BLD AUTO: 218 CELLS/UL (ref 15–500)
EOSINOPHIL NFR BLD AUTO: 3.3 %
ERYTHROCYTE [DISTWIDTH] IN BLOOD BY AUTOMATED COUNT: 13 % (ref 11–15)
EST. AVERAGE GLUCOSE BLD GHB EST-MCNC: 134 MG/DL
EST. AVERAGE GLUCOSE BLD GHB EST-SCNC: 7.4 MMOL/L
GLUCOSE SERPL-MCNC: 110 MG/DL (ref 65–99)
HBA1C MFR BLD: 6.3 % OF TOTAL HGB
HCT VFR BLD AUTO: 40.7 % (ref 35–45)
HDLC SERPL-MCNC: 48 MG/DL
HGB BLD-MCNC: 13.5 G/DL (ref 11.7–15.5)
LDLC SERPL CALC-MCNC: 175 MG/DL (CALC)
LYMPHOCYTES # BLD AUTO: 2587 CELLS/UL (ref 850–3900)
LYMPHOCYTES NFR BLD AUTO: 39.2 %
MCH RBC QN AUTO: 30.8 PG (ref 27–33)
MCHC RBC AUTO-ENTMCNC: 33.2 G/DL (ref 32–36)
MCV RBC AUTO: 92.9 FL (ref 80–100)
MONOCYTES # BLD AUTO: 370 CELLS/UL (ref 200–950)
MONOCYTES NFR BLD AUTO: 5.6 %
NEUTROPHILS # BLD AUTO: 3366 CELLS/UL (ref 1500–7800)
NEUTROPHILS NFR BLD AUTO: 51 %
NONHDLC SERPL-MCNC: 224 MG/DL (CALC)
PLATELET # BLD AUTO: 302 THOUSAND/UL (ref 140–400)
PMV BLD REES-ECKER: 11.9 FL (ref 7.5–12.5)
POTASSIUM SERPL-SCNC: 4.1 MMOL/L (ref 3.5–5.3)
PROT SERPL-MCNC: 7.2 G/DL (ref 6.1–8.1)
RBC # BLD AUTO: 4.38 MILLION/UL (ref 3.8–5.1)
SODIUM SERPL-SCNC: 142 MMOL/L (ref 135–146)
TRIGL SERPL-MCNC: 273 MG/DL
TSH SERPL-ACNC: 3.53 MIU/L (ref 0.4–4.5)
VIT B12 SERPL-MCNC: 726 PG/ML (ref 200–1100)
WBC # BLD AUTO: 6.6 THOUSAND/UL (ref 3.8–10.8)

## 2025-03-18 ENCOUNTER — APPOINTMENT (OUTPATIENT)
Dept: PRIMARY CARE | Facility: CLINIC | Age: 80
End: 2025-03-18
Payer: MEDICARE

## 2025-03-18 VITALS
BODY MASS INDEX: 30.93 KG/M2 | TEMPERATURE: 97.3 F | SYSTOLIC BLOOD PRESSURE: 116 MMHG | WEIGHT: 170.2 LBS | DIASTOLIC BLOOD PRESSURE: 60 MMHG | OXYGEN SATURATION: 98 % | HEART RATE: 77 BPM

## 2025-03-18 DIAGNOSIS — E78.5 HYPERLIPIDEMIA, UNSPECIFIED HYPERLIPIDEMIA TYPE: ICD-10-CM

## 2025-03-18 DIAGNOSIS — Z23 NEED FOR TDAP VACCINATION: ICD-10-CM

## 2025-03-18 DIAGNOSIS — R73.03 PREDIABETES: ICD-10-CM

## 2025-03-18 DIAGNOSIS — K21.9 GASTROESOPHAGEAL REFLUX DISEASE WITHOUT ESOPHAGITIS: Chronic | ICD-10-CM

## 2025-03-18 DIAGNOSIS — R06.09 DYSPNEA ON EXERTION: Primary | ICD-10-CM

## 2025-03-18 DIAGNOSIS — S09.90XA ACUTE HEAD INJURY WITHOUT LOSS OF CONSCIOUSNESS, INITIAL ENCOUNTER: ICD-10-CM

## 2025-03-18 DIAGNOSIS — E03.9 ACQUIRED HYPOTHYROIDISM: Chronic | ICD-10-CM

## 2025-03-18 DIAGNOSIS — I10 PRIMARY HYPERTENSION: Chronic | ICD-10-CM

## 2025-03-18 DIAGNOSIS — I44.7 LEFT BUNDLE BRANCH BLOCK: ICD-10-CM

## 2025-03-18 DIAGNOSIS — K90.0 CELIAC DISEASE (HHS-HCC): Chronic | ICD-10-CM

## 2025-03-18 DIAGNOSIS — J30.9 ALLERGIC RHINITIS, UNSPECIFIED SEASONALITY, UNSPECIFIED TRIGGER: Chronic | ICD-10-CM

## 2025-03-18 DIAGNOSIS — Z13.21 ENCOUNTER FOR SCREENING FOR NUTRITIONAL DISORDER: ICD-10-CM

## 2025-03-18 DIAGNOSIS — F41.9 ANXIETY: ICD-10-CM

## 2025-03-18 DIAGNOSIS — Z95.818 IMPLANTABLE LOOP RECORDER PRESENT: ICD-10-CM
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
## 2025-03-18 DIAGNOSIS — N18.2 STAGE 2 CHRONIC KIDNEY DISEASE: ICD-10-CM

## 2025-03-18 PROCEDURE — 1160F RVW MEDS BY RX/DR IN RCRD: CPT | Performed by: FAMILY MEDICINE

## 2025-03-18 PROCEDURE — 3074F SYST BP LT 130 MM HG: CPT | Performed by: FAMILY MEDICINE

## 2025-03-18 PROCEDURE — 1159F MED LIST DOCD IN RCRD: CPT | Performed by: FAMILY MEDICINE

## 2025-03-18 PROCEDURE — 3078F DIAST BP <80 MM HG: CPT | Performed by: FAMILY MEDICINE

## 2025-03-18 PROCEDURE — 93000 ELECTROCARDIOGRAM COMPLETE: CPT | Performed by: FAMILY MEDICINE

## 2025-03-18 PROCEDURE — G2211 COMPLEX E/M VISIT ADD ON: HCPCS | Performed by: FAMILY MEDICINE

## 2025-03-18 PROCEDURE — 1036F TOBACCO NON-USER: CPT | Performed by: FAMILY MEDICINE

## 2025-03-18 PROCEDURE — 99215 OFFICE O/P EST HI 40 MIN: CPT | Performed by: FAMILY MEDICINE

## 2025-03-18 RX ORDER — AMLODIPINE BESYLATE 5 MG/1
5 TABLET ORAL DAILY
Qty: 90 TABLET | Refills: 1 | Status: SHIPPED | OUTPATIENT
Start: 2025-03-18

## 2025-03-18 RX ORDER — SERTRALINE HYDROCHLORIDE 50 MG/1
50 TABLET, FILM COATED ORAL DAILY
Start: 2025-03-18 | End: 2025-05-17

## 2025-03-18 RX ORDER — MONTELUKAST SODIUM 10 MG/1
10 TABLET ORAL NIGHTLY
Qty: 90 TABLET | Refills: 1 | Status: CANCELLED | OUTPATIENT
Start: 2025-03-18

## 2025-03-18 RX ORDER — CETIRIZINE HYDROCHLORIDE 10 MG/1
10 TABLET ORAL DAILY
Qty: 90 TABLET | Refills: 1 | Status: SHIPPED | OUTPATIENT
Start: 2025-03-18

## 2025-03-18 RX ORDER — SERTRALINE HYDROCHLORIDE 25 MG/1
25 TABLET, FILM COATED ORAL DAILY
Qty: 90 TABLET | Refills: 1 | Status: CANCELLED | OUTPATIENT
Start: 2025-03-18

## 2025-03-18 RX ORDER — LEVOTHYROXINE SODIUM 25 UG/1
TABLET ORAL
Qty: 120 TABLET | Refills: 1 | Status: SHIPPED | OUTPATIENT
Start: 2025-03-18

## 2025-03-18 RX ORDER — OMEPRAZOLE 40 MG/1
40 CAPSULE, DELAYED RELEASE ORAL DAILY
Qty: 90 CAPSULE | Refills: 1 | Status: SHIPPED | OUTPATIENT
Start: 2025-03-18

## 2025-03-18 ASSESSMENT — ENCOUNTER SYMPTOMS
FEVER: 0
VOMITING: 0
COUGH: 0
SLEEP DISTURBANCE: 0
CHILLS: 0
NERVOUS/ANXIOUS: 1
DYSPHORIC MOOD: 0
DIARRHEA: 0
WHEEZING: 0
PALPITATIONS: 0
NAUSEA: 0
SHORTNESS OF BREATH: 1

## 2025-03-18 NOTE — ASSESSMENT & PLAN NOTE
Discussed diet changes to improve cholesterol, especially since worsened significantly over the past few months.

## 2025-03-18 NOTE — PROGRESS NOTES
Subjective   Patient ID: Christine Atwood is a 79 y.o. female who presents for Hypertension (Recheck, review bw ), Hyperlipidemia, and Prediabetes.    Mary has been dealing with issues at her senior living center. They are no longer willing to provide her with gluten-free meals, and she requires gluten-free food due to her Celiac disease. She is very worried about this and thinks that they may need to move to another facility that can meet her dietary requirements. Overall her anxiety has improved with addition of sertraline, but she feels the medication has not been working as well recently with the increased stress levels.     Over the past month, she has been experiencing shortness of breath with exertion. The levator in her apartment complex is broken and so she has been having issues when navigating stairs. Has not had any chest pain. No recent syncopal episodes.     She did recently have a fall. Was sleeping when fell out of bed. Has swollen area above right eye that is slowly healing. Has not had any pain or headaches.          Review of Systems   Constitutional:  Negative for chills and fever.   HENT:  Negative for congestion.    Respiratory:  Positive for shortness of breath. Negative for cough and wheezing.    Cardiovascular:  Negative for chest pain, palpitations and leg swelling.   Gastrointestinal:  Negative for diarrhea, nausea and vomiting.   Psychiatric/Behavioral:  Negative for dysphoric mood and sleep disturbance. The patient is nervous/anxious.        Objective   /60   Pulse 77   Temp 36.3 °C (97.3 °F)   Wt 77.2 kg (170 lb 3.2 oz)   SpO2 98%   BMI 30.93 kg/m²     Physical Exam  Constitutional:       General: She is not in acute distress.     Appearance: Normal appearance.   HENT:      Head: Normocephalic.      Mouth/Throat:      Mouth: Mucous membranes are moist.   Eyes:      Extraocular Movements: Extraocular movements intact.      Conjunctiva/sclera: Conjunctivae normal.   Cardiovascular:       Rate and Rhythm: Normal rate and regular rhythm.      Heart sounds: No murmur heard.  Pulmonary:      Breath sounds: No wheezing or rhonchi.   Musculoskeletal:      Cervical back: Neck supple.   Skin:     General: Skin is warm and dry.   Neurological:      Mental Status: She is alert.   Psychiatric:         Mood and Affect: Mood normal.         Behavior: Behavior normal.         Assessment/Plan   Problem List Items Addressed This Visit             ICD-10-CM    Acquired hypothyroidism (Chronic) E03.9     TSH stable.          Relevant Medications    levothyroxine (Synthroid, Levoxyl) 25 mcg tablet    Other Relevant Orders    Thyroid Stimulating Hormone    Primary hypertension (Chronic) I10     Controlled.         Relevant Medications    amLODIPine (Norvasc) 5 mg tablet    Allergic rhinitis (Chronic) J30.9    Relevant Medications    cetirizine (ZyrTEC) 10 mg tablet    Gastroesophageal reflux disease without esophagitis (Chronic) K21.9    Relevant Medications    omeprazole (PriLOSEC) 40 mg DR capsule    Celiac disease ([facility]) (Chronic) K90.0     Continue to follow gluten-free diet.  Letter written for patient to give to her living facility to see if they will accommodate her.         Implantable loop recorder present Z95.818    Relevant Orders    Referral to Cardiology    Anxiety F41.9     Increase sertraline to 50mg.          Relevant Medications    sertraline (Zoloft) 50 mg tablet    Hyperlipidemia E78.5     Discussed diet changes to improve cholesterol, especially since worsened significantly over the past few months.          Relevant Orders    Lipid Panel    Prediabetes R73.03     Hemoglobin A1c 6.3%.  Continue diet changes.         Relevant Orders    CBC and Auto Differential    Comprehensive Metabolic Panel    Hemoglobin A1C    Stage 2 chronic kidney disease N18.2     GFR stable.  Protein screening ordered for next appointment.         Relevant Orders    Albumin-Creatinine Ratio, Urine Random    Dyspnea on  exertion - Primary R06.09     EKG showed NSR and LBBB. Walking Pox stable. Check CXR. Refer to cardiology for further work-up.         Relevant Orders    ECG 12 Lead (Completed)    Check Pulse Oximetry while ambulating (Completed)    XR chest 2 views    Referral to Cardiology    Left bundle branch block I44.7     Left bundle branch block noted on EKG.  Since patient has been having dyspnea with exertion, referred to cardiology.         Relevant Orders    Referral to Cardiology     Other Visit Diagnoses         Codes    Fall from bed, initial encounter     W06.XXXA    Acute head injury without loss of consciousness, initial encounter     S09.90XA    No headaches or dizziness to suggest need for imaging. Discussed that swelling should contiue to improve over time. Recommend apply ice to area.     Encounter for screening for nutritional disorder     Z13.21    Relevant Orders    Iron and TIBC    Ferritin    Vitamin D 25-Hydroxy,Total (for eval of Vitamin D levels)    Need for Tdap vaccination     Z23    Relevant Medications    diphth,pertus,acell,,tetanus (BoostRIX) 2.5-8-5 Lf-mcg-Lf/0.5mL injection    Body mass index (BMI) 30.0-30.9, adult     Z68.30    Relevant Orders    Vitamin D 25-Hydroxy,Total (for eval of Vitamin D levels)                 Time Based Billing:  - Prep Time on Date of Patient Encounter:  2 minutes  - Time Directly with Patient/Family/Caregiver:   41 minutes  - Documentation Time:   8 minutes    Total Minutes:  51

## 2025-03-18 NOTE — LETTER
March 18, 2025         Patient: Christine Atwood   YOB: 1945   Date of Visit: 3/18/2025       To whom it may concern:    Christine Atwood is under my care and was last seen in the office on 3/18/2025. Due to her Celiac disease, it is medically necessary for her to follow a gluten-free diet.                 Sincerely,        Dorothea Francois, DO

## 2025-03-18 NOTE — ASSESSMENT & PLAN NOTE
Left bundle branch block noted on EKG.  Since patient has been having dyspnea with exertion, referred to cardiology.

## 2025-03-18 NOTE — ASSESSMENT & PLAN NOTE
Continue to follow gluten-free diet.  Letter written for patient to give to her living facility to see if they will accommodate her.

## 2025-03-19 ENCOUNTER — PATIENT MESSAGE (OUTPATIENT)
Dept: PRIMARY CARE | Facility: CLINIC | Age: 80
End: 2025-03-19
Payer: MEDICARE

## 2025-03-19 DIAGNOSIS — H90.3 SENSORINEURAL HEARING LOSS, BILATERAL: Primary | ICD-10-CM

## 2025-03-26 ENCOUNTER — HOSPITAL ENCOUNTER (OUTPATIENT)
Dept: RADIOLOGY | Facility: CLINIC | Age: 80
Discharge: HOME | End: 2025-03-26
Payer: MEDICARE

## 2025-03-26 DIAGNOSIS — R06.09 DYSPNEA ON EXERTION: ICD-10-CM

## 2025-03-26 PROCEDURE — 71046 X-RAY EXAM CHEST 2 VIEWS: CPT

## 2025-04-03 ENCOUNTER — CLINICAL SUPPORT (OUTPATIENT)
Dept: AUDIOLOGY | Facility: HOSPITAL | Age: 80
End: 2025-04-03

## 2025-04-03 DIAGNOSIS — H90.3 SENSORINEURAL HEARING LOSS, BILATERAL: Primary | ICD-10-CM

## 2025-04-03 PROCEDURE — V5299 HEARING SERVICE: HCPCS | Mod: AUDSP | Performed by: AUDIOLOGIST

## 2025-04-03 NOTE — PROGRESS NOTES
"AUDIOLOGY HEARING AID CHECK      Name:  Christine Atwood \"Mary\"  :  1945  Age:  79 y.o.  Date of Appointment:  4/3/2025     History:  Ms. Atwood is seen today for Hearing Aid Check  The left hearing aid is dead.  The right hearing aid is working well for her.  Her physician has entered a referral for new hearing evaluation.  She was scheduled for hearing aid check today.  Last hearing evaluation was less than one year ago.      Current hearing aids:  Hearing Aids:  Phonak Audeo M50R  Right # [id number]    Left # [id number]  Speakers: #0M (4.0)   Domes: medium vented    [place]  Repair/loss warranty ends:   Last hearing evaluation: 2024    Upon initial examination, the left hearing aid is dead, but appears to be charging in the .  The right hearing aid is good.     Cleaned and checked both devices.    Replaced filters bilaterally.  Replaced domes bilaterally.     Both hearing aids sound and look good on final examination.      Otoscopic observation revealed clear ear canals bilaterally.    Per data logging, this patient wears the hearing aids 8-9 hours / day on average.      IMPRESSIONS:  Both hearing aids function appropriately following today's routine maintenance procedures.  The patient also reports the aids sound good.      RECOMMENDATIONS:  -Daily use of hearing aids.  -Daily maintenance to be performed at home.   -Appointment is scheduled on 2025 at 10:30am for new hearing evaluation and hearing aid evaluation.  If her hearing aids quit working before that, we will consider new hearing aid evaluation sooner.      PATIENT EDUCATION:   Discussed above information with Ms. Atwood.  Questions were addressed and the patient was encouraged to contact our department should concerns arise.    Verified patient's identification verbally and by armband.    Time:  11:05 to 11:29      FRANCESCA Topete, CCC-A  Senior Audiologist  "

## 2025-05-07 ENCOUNTER — TELEPHONE (OUTPATIENT)
Dept: CARDIOLOGY | Facility: HOSPITAL | Age: 80
End: 2025-05-07
Payer: MEDICARE

## 2025-05-09 ENCOUNTER — OFFICE VISIT (OUTPATIENT)
Dept: CARDIOLOGY | Facility: HOSPITAL | Age: 80
End: 2025-05-09
Payer: MEDICARE

## 2025-05-09 VITALS
SYSTOLIC BLOOD PRESSURE: 132 MMHG | DIASTOLIC BLOOD PRESSURE: 60 MMHG | HEIGHT: 63 IN | WEIGHT: 166 LBS | HEART RATE: 74 BPM | BODY MASS INDEX: 29.41 KG/M2

## 2025-05-09 DIAGNOSIS — Z95.818 STATUS POST PLACEMENT OF IMPLANTABLE LOOP RECORDER: Primary | ICD-10-CM

## 2025-05-09 DIAGNOSIS — R07.9 CHEST PAIN, UNSPECIFIED TYPE: ICD-10-CM

## 2025-05-09 DIAGNOSIS — I44.7 LEFT BUNDLE BRANCH BLOCK: ICD-10-CM

## 2025-05-09 DIAGNOSIS — R06.09 DYSPNEA ON EXERTION: ICD-10-CM

## 2025-05-09 DIAGNOSIS — Z95.818 IMPLANTABLE LOOP RECORDER PRESENT: ICD-10-CM

## 2025-05-09 DIAGNOSIS — I73.9 CLAUDICATION OF BOTH LOWER EXTREMITIES: Primary | ICD-10-CM

## 2025-05-09 DIAGNOSIS — R55 SYNCOPE AND COLLAPSE: ICD-10-CM

## 2025-05-09 DIAGNOSIS — E78.5 HYPERLIPIDEMIA, UNSPECIFIED HYPERLIPIDEMIA TYPE: ICD-10-CM

## 2025-05-09 LAB
ATRIAL RATE: 74 BPM
P AXIS: 54 DEGREES
P OFFSET: 208 MS
P ONSET: 150 MS
PR INTERVAL: 130 MS
Q ONSET: 215 MS
QRS COUNT: 12 BEATS
QRS DURATION: 130 MS
QT INTERVAL: 412 MS
QTC CALCULATION(BAZETT): 457 MS
QTC FREDERICIA: 442 MS
R AXIS: 37 DEGREES
T AXIS: 8 DEGREES
T OFFSET: 421 MS
VENTRICULAR RATE: 74 BPM

## 2025-05-09 PROCEDURE — 99215 OFFICE O/P EST HI 40 MIN: CPT | Mod: 25 | Performed by: INTERNAL MEDICINE

## 2025-05-09 PROCEDURE — 93005 ELECTROCARDIOGRAM TRACING: CPT | Performed by: INTERNAL MEDICINE

## 2025-05-09 ASSESSMENT — ENCOUNTER SYMPTOMS
LOSS OF SENSATION IN FEET: 0
DEPRESSION: 0
OCCASIONAL FEELINGS OF UNSTEADINESS: 1

## 2025-05-09 NOTE — PROGRESS NOTES
"Chief Complaint:   New Patient Visit     History Of Present Illness:    Mary Atwood is a 80 y.o. female history of hypertension, hyperlipidemia, recurrent vasovagal syncope with implantable loop recorder in place.  Even though tilt test was negative occasionally during syncopal episode she has had bradycardic episodes on the ILR.  In the past 1 year she has been asymptomatic since being started on sertraline she states.  She would like the ILR to be removed as it is causing local discomfort.    She states that her hips hurt and the calf muscles hurt when she is trying to go up the stairs.  No had any history of cardiac disease.    Her EKG shows sinus rhythm with left bundle branch block which is pre-existing..     Last Recorded Vitals:  Vitals:    05/09/25 1034   BP: 132/60   BP Location: Left arm   Pulse: 74   Weight: 75.3 kg (166 lb)   Height: 1.588 m (5' 2.5\")       Past Medical History:  She has a past medical history of Hypertension and Hypothyroidism.    Past Surgical History:  She has a past surgical history that includes Hysterectomy; Appendectomy; and Breast biopsy.      Social History:  She reports that she has never smoked. She has never used smokeless tobacco. She reports that she does not currently use alcohol. She reports that she does not use drugs.    Family History:  Family History[1]     Allergies:  Amoxicillin, Biaxin [clarithromycin], Cephalexin, Clindamycin, and Penicillins    Outpatient Medications:  Current Outpatient Medications   Medication Instructions    amitriptyline (Elavil) 25 mg tablet     amLODIPine (NORVASC) 5 mg, oral, Daily    cetirizine (ZYRTEC) 10 mg, oral, Daily    cholecalciferol (VITAMIN D-3) 125 mcg, Daily    cyanocobalamin (VITAMIN B-12) 3,000 mcg, Daily    diclofenac sodium (VOLTAREN) 4 g, 4 times daily PRN    fluticasone (Flonase) 50 mcg/actuation nasal spray     glucosamine/chondr tubbs A sod (OSTEO BI-FLEX ORAL) Take by mouth.    levothyroxine (Synthroid, Levoxyl) 25 mcg " tablet Take 1 tab PO daily except 1.5 tablets on Sundays.    metroNIDAZOLE (Metrocream) 0.75 % cream 2 times daily    montelukast (SINGULAIR) 10 mg, oral, Nightly    multivitamin tablet 1 tablet, Daily    multivitamin with minerals tablet 1 tablet, Daily    NON FORMULARY 5,000 each, Daily    omega-3 fatty acids-fish oil (Fish OiL) 360-1,200 mg capsule Take by mouth.    omeprazole (PRILOSEC) 40 mg, oral, Daily, Do not crush or chew.    sertraline (ZOLOFT) 50 mg, oral, Daily    zinc gluconate 50 mg tablet Daily       Physical Exam:  Physical Exam  Vitals reviewed.   Constitutional:       Appearance: Normal appearance.   Neck:      Vascular: No carotid bruit or JVD.   Cardiovascular:      Rate and Rhythm: Normal rate and regular rhythm.      Pulses:           Dorsalis pedis pulses are 0 on the right side and 1+ on the left side.      Heart sounds: Normal heart sounds, S1 normal and S2 normal.   Pulmonary:      Effort: Pulmonary effort is normal. No respiratory distress.      Breath sounds: No wheezing or rales.   Abdominal:      General: Abdomen is flat.      Palpations: Abdomen is soft.   Musculoskeletal:      Right lower leg: No edema.      Left lower leg: No edema.   Skin:     General: Skin is warm.   Neurological:      Mental Status: She is alert and oriented to person, place, and time. Mental status is at baseline.   Psychiatric:         Mood and Affect: Mood normal.         Behavior: Behavior normal.           Last Labs:  CBC -  Lab Results   Component Value Date    WBC 6.6 03/10/2025    HGB 13.5 03/10/2025    HCT 40.7 03/10/2025    MCV 92.9 03/10/2025     03/10/2025       CMP -  Lab Results   Component Value Date    CALCIUM 9.5 03/10/2025    PROT 7.2 03/10/2025    ALBUMIN 4.2 03/10/2025    AST 15 03/10/2025    ALT 16 03/10/2025    ALKPHOS 59 03/10/2025    BILITOT 0.5 03/10/2025       LIPID PANEL -   Lab Results   Component Value Date    CHOL 272 (H) 03/10/2025    TRIG 273 (H) 03/10/2025    HDL 48 (L)  "03/10/2025    CHHDL 5.7 (H) 03/10/2025    VLDL 72 (H) 09/10/2024    NHDL 224 (H) 03/10/2025       RENAL FUNCTION PANEL -   Lab Results   Component Value Date    GLUCOSE 110 (H) 03/10/2025     03/10/2025    K 4.1 03/10/2025     03/10/2025    CO2 22 03/10/2025    ANIONGAP 15 03/10/2025    BUN 16 03/10/2025    CREATININE 0.86 03/10/2025    CALCIUM 9.5 03/10/2025    ALBUMIN 4.2 03/10/2025        Lab Results   Component Value Date    HGBA1C 6.3 (H) 03/10/2025       Last Cardiology Tests:  ECG:  ECG 12 Lead 03/18/2025      Echo:  No results found for this or any previous visit from the past 1095 days.      Ejection Fractions:  No results found for: \"EF\"    Cath:  No results found for this or any previous visit from the past 1095 days.      Stress Test:  No results found for this or any previous visit from the past 1095 days.      Cardiac Imaging:  No results found for this or any previous visit from the past 1095 days.          Assessment/Plan   1-history of vasovagal syncope-requesting ILR removal will refer to EP team and message was sent to schedule procedure.    Discussed small risk of bleeding and scar formation.    2-claudication type of symptoms with hyperlipidemia and hypertension and abnormal peripheral pulses on the right-arrange for URBANO with exercise.  Also check ESR to rule out inflammatory cause.    3-left bundle branch block-check echocardiogram.    4-hyperlipidemia-may need treatment.  Dietary advice given, advised to increase fiber intake antioxidant intake and limit processed food red meat and animal fat.  Repeat lipid profile in 3 months.      Alvaro Guerrero MD         [1]   Family History  Problem Relation Name Age of Onset    Epilepsy Mother      Cirrhosis Mother      Dementia Half-Sister      Lymphoma Half-Sister      Hypertension Half-Sister      Atrial fibrillation Half-Brother      Hyperlipidemia Half-Brother       "

## 2025-05-13 ENCOUNTER — APPOINTMENT (OUTPATIENT)
Dept: DERMATOLOGY | Facility: CLINIC | Age: 80
End: 2025-05-13
Payer: MEDICARE

## 2025-05-13 DIAGNOSIS — D22.5 MELANOCYTIC NEVUS OF TRUNK: ICD-10-CM

## 2025-05-13 DIAGNOSIS — L57.8 DIFFUSE PHOTODAMAGE OF SKIN: ICD-10-CM

## 2025-05-13 DIAGNOSIS — D18.01 HEMANGIOMA OF SKIN: ICD-10-CM

## 2025-05-13 DIAGNOSIS — L82.1 SEBORRHEIC KERATOSIS: ICD-10-CM

## 2025-05-13 DIAGNOSIS — D48.5 NEOPLASM OF UNCERTAIN BEHAVIOR OF SKIN: Primary | ICD-10-CM

## 2025-05-13 DIAGNOSIS — L82.0 INFLAMED SEBORRHEIC KERATOSIS: ICD-10-CM

## 2025-05-13 DIAGNOSIS — L73.9 FOLLICULITIS: ICD-10-CM

## 2025-05-13 DIAGNOSIS — L57.0 ACTINIC KERATOSIS: ICD-10-CM

## 2025-05-13 PROCEDURE — 11306 SHAVE SKIN LESION 0.6-1.0 CM: CPT | Performed by: DERMATOLOGY

## 2025-05-13 PROCEDURE — 17110 DESTRUCTION B9 LES UP TO 14: CPT | Performed by: DERMATOLOGY

## 2025-05-13 PROCEDURE — 17003 DESTRUCT PREMALG LES 2-14: CPT | Performed by: DERMATOLOGY

## 2025-05-13 PROCEDURE — 99204 OFFICE O/P NEW MOD 45 MIN: CPT | Performed by: DERMATOLOGY

## 2025-05-13 PROCEDURE — 1159F MED LIST DOCD IN RCRD: CPT | Performed by: DERMATOLOGY

## 2025-05-13 PROCEDURE — 17000 DESTRUCT PREMALG LESION: CPT | Performed by: DERMATOLOGY

## 2025-05-13 RX ORDER — CLINDAMYCIN PHOSPHATE 10 UG/ML
LOTION TOPICAL 2 TIMES DAILY
Qty: 60 ML | Refills: 11 | Status: SHIPPED | OUTPATIENT
Start: 2025-05-13 | End: 2026-05-13

## 2025-05-13 ASSESSMENT — DERMATOLOGY QUALITY OF LIFE (QOL) ASSESSMENT
RATE HOW EMOTIONALLY BOTHERED YOU ARE BY YOUR SKIN PROBLEM (FOR EXAMPLE, WORRY, EMBARRASSMENT, FRUSTRATION): 0 - NEVER BOTHERED
DATE THE QUALITY-OF-LIFE ASSESSMENT WAS COMPLETED: 67338
RATE HOW BOTHERED YOU ARE BY SYMPTOMS OF YOUR SKIN PROBLEM (EG, ITCHING, STINGING BURNING, HURTING OR SKIN IRRITATION): 0 - NEVER BOTHERED
WHAT SINGLE SKIN CONDITION LISTED BELOW IS THE PATIENT ANSWERING THE QUALITY-OF-LIFE ASSESSMENT QUESTIONS ABOUT: NONE OF THE ABOVE
RATE HOW BOTHERED YOU ARE BY EFFECTS OF YOUR SKIN PROBLEMS ON YOUR ACTIVITIES (EG, GOING OUT, ACCOMPLISHING WHAT YOU WANT, WORK ACTIVITIES OR YOUR RELATIONSHIPS WITH OTHERS): 0 - NEVER BOTHERED
ARE THERE EXCLUSIONS OR EXCEPTIONS FOR THE QUALITY OF LIFE ASSESSMENT: NO

## 2025-05-13 ASSESSMENT — DERMATOLOGY PATIENT ASSESSMENT
DO YOU USE A TANNING BED: NO
DO YOU HAVE ANY NEW OR CHANGING LESIONS: YES
ARE YOU TRYING TO GET PREGNANT: NO
DO YOU HAVE IRREGULAR MENSTRUAL CYCLES: NO
DO YOU USE SUNSCREEN: OCCASIONALLY
ARE YOU ON BIRTH CONTROL: NO

## 2025-05-13 ASSESSMENT — PATIENT GLOBAL ASSESSMENT (PGA): PATIENT GLOBAL ASSESSMENT: PATIENT GLOBAL ASSESSMENT:  1 - CLEAR

## 2025-05-13 ASSESSMENT — ITCH NUMERIC RATING SCALE: HOW SEVERE IS YOUR ITCHING?: 0

## 2025-05-13 NOTE — Clinical Note
Scattered on the patient's face, neck, trunk, and bilateral upper extremities, there are multiple tan- to light brown-colored, hyperkeratotic, stuck-on appearing papules of varying size and shape

## 2025-05-13 NOTE — Clinical Note
Actinic Keratoses -scattered on bilateral cheeks.  The pre-cancerous nature of these lesions and treatment options were discussed with the patient today.  At this time, I recommend treatment with liquid nitrogen cryotherapy.  The patient expressed understanding, is in agreement with this plan, and wishes to proceed with cryotherapy today.

## 2025-05-13 NOTE — Clinical Note
On the patient's left medial mid upper back, there is a 1 cm erythematous and light brown-colored, hyperkeratotic, stuck-on appearing papule with a surrounding rim of erythema

## 2025-05-13 NOTE — PROGRESS NOTES
"Fabio Atwood \"Mary\" is a 80 y.o. female who presents for the following: Skin Exam.  She notes a scaly bump on the top of her right hand, which has been present for 4 to 5 months and has increased in size and changed shape, according to patient.  She also notes a brown, raised, rough bump on her left upper back, which has been present for several months and has been itching recently, especially when it rubs on her clothing.  It has not changed in any other way, including in size, shape, or color, and it does not hurt or bleed.  Lastly, she notes intermittent pimple breakouts on her upper back.  She denies any other new, changing, or concerning skin lesions; no bleeding, itching, or burning lesions.      Review of Systems:  No other skin or systemic complaints other than what is documented elsewhere in the note.    The following portions of the chart were reviewed this encounter and updated as appropriate:       Skin Cancer History  Biopsy Log Book  No skin cancers from Specimen Tracking.    Additional History      Specialty Problems          Dermatology Problems    Lowell Coleman       Past Dermatologic / Past Relevant Medical History:    No history of atypical nevi or skin cancer    Family History:    Brother skin cancer of unknown type    Social History:    The patient states she is retired from working as an  and used to live in [place], but moved here to live closer to her only daughter and 5 grandchildren    Allergies:  Amoxicillin, Biaxin [clarithromycin], Cephalexin, Clindamycin, and Penicillins    Current Medications / CAM's:  Current Medications[1]     Objective   Well appearing patient in no apparent distress; mood and affect are within normal limits.    A waist-up examination was performed including scalp, face, eyes, ears, nose, lips, neck, chest, axillae, abdomen, back, and bilateral upper extremities. All findings within normal limits unless otherwise noted " below.        Assessment/Plan   Skin Exam  1. NEOPLASM OF UNCERTAIN BEHAVIOR OF SKIN  Right Lateral Proximal Dorsal Hand  7 mm erythematous, scaly papule     Shave removal    Lesion length (cm):  0.7  Margin per side (cm):  0  Lesion diameter (cm):  0.7  Informed consent: discussed and consent obtained    Timeout: patient name, date of birth, surgical site, and procedure verified    Procedure prep:  Patient was prepped and draped  Anesthesia: the lesion was anesthetized in a standard fashion    Anesthetic:  1% lidocaine w/ epinephrine 1-100,000 local infiltration  Instrument used: flexible razor blade    Hemostasis achieved with: aluminum chloride    Outcome: patient tolerated procedure well    Post-procedure details: sterile dressing applied and wound care instructions given    Dressing type: bandage and petrolatum      Staff Communication: Dermatology Local Anesthesia: 1 % Lidocaine / Epinephrine - Amount:0.5ml  Specimen 1 - Dermatopathology- DERM LAB  Differential Diagnosis: SK v SCC  Check Margins Yes/No?:    Comments:    Dermpath Lab: Routine Histopathology (formalin-fixed tissue)  2. INFLAMED SEBORRHEIC KERATOSIS  Left Upper Back  On the patient's left medial mid upper back, there is a 1 cm erythematous and light brown-colored, hyperkeratotic, stuck-on appearing papule with a surrounding rim of erythema  Inflamed Seborrheic Keratosis -left medial mid upper back.  The benign nature of this lesion was discussed with the patient today and reassurance provided.  Given the history the patient provides of frequent irritation and associated symptoms as well as its inflamed appearance on exam today, I offered to treat this lesion with liquid nitrogen cryotherapy.  The patient expressed understanding, is in agreement with this plan, and wishes to proceed with cryotherapy today.  Destr of lesion - Left Upper Back  Complexity: simple    Destruction method: cryotherapy    Informed consent: discussed and consent obtained     Lesion destroyed using liquid nitrogen: Yes    Cryotherapy cycles:  2  Outcome: patient tolerated procedure well with no complications    Post-procedure details: wound care instructions given    3. ACTINIC KERATOSIS (7)  Head - Anterior (Face) (7)  Scattered on the patient's bilateral cheeks, there are 7 erythematous, gritty, scaly macules   Actinic Keratoses -scattered on bilateral cheeks.  The pre-cancerous nature of these lesions and treatment options were discussed with the patient today.  At this time, I recommend treatment with liquid nitrogen cryotherapy.  The patient expressed understanding, is in agreement with this plan, and wishes to proceed with cryotherapy today.  Destr of lesion - Head - Anterior (Face) (7)  Complexity: simple    Destruction method: cryotherapy    Informed consent: discussed and consent obtained    Lesion destroyed using liquid nitrogen: Yes    Cryotherapy cycles:  1  Outcome: patient tolerated procedure well with no complications    Post-procedure details: wound care instructions given    4. MELANOCYTIC NEVUS OF TRUNK  Generalized  Scattered on the patient's face, neck, trunk, and bilateral upper extremities, there are several small, round- to oval-shaped, brown-pigmented and pink-colored, symmetric, uniform-appearing macules and dome-shaped papules  Clinically benign- to slightly atypical-appearing nevi - the clinically benign- to slightly atypical-appearing nature of the patient's nevi was discussed with the patient today.  None of the patient's nevi meet threshold for biopsy today.  I emphasized the importance of performing monthly self-skin exams using the ABCDs of monitoring moles, which were reviewed with the patient today and an informational hand-out provided.  I also emphasized the importance of sun avoidance and sun protection with daily sunscreen use.  The patient expressed understanding and is in agreement with this plan.  5. SEBORRHEIC KERATOSIS  Generalized  Scattered  on the patient's face, neck, trunk, and bilateral upper extremities, there are multiple tan- to light brown-colored, hyperkeratotic, stuck-on appearing papules of varying size and shape  Seborrheic Keratoses - the benign nature of these lesions was discussed with the patient today and reassurance provided.  No treatment is medically indicated for the noninflamed SKs at this time.  6. HEMANGIOMA OF SKIN  Generalized  Scattered on the patient's face, neck, trunk, and bilateral upper extremities, there are multiple small, round, cherry red- to purplish-colored, symmetric, uniform, vascular-appearing macules and papules  Cherry Angiomas - the benign nature of these vascular lesions was discussed with the patient today and reassurance provided.  No treatment is medically indicated for these lesions at this time.  7. FOLLICULITIS  Right Upper Back  Scattered on the patient's back, there are several follicular-based erythematous, inflammatory papules and pustules  Folliculitis -back.  The bacterial nature of this condition and treatment options were discussed with the patient today.  At this time, I recommend topical antibiotic therapy with Clindamycin 1% lotion, which the patient was instructed to apply twice daily to the affected areas or up to 3-4 times per day as needed for active lesions.  The risks, benefits, and side effects of this medication were discussed.  The patient expressed understanding and is in agreement with this plan.  clindamycin (Cleocin T) 1 % lotion - Right Upper Back  Apply topically 2 times a day.  8. DIFFUSE PHOTODAMAGE OF SKIN  Photodistributed  Diffuse photodamage with actinic changes with telangiectasia and mottled pigmentation in sun-exposed areas.  Photodamage.  The signs and symptoms of skin cancer were reviewed and the patient was advised to practice sun protection and sun avoidance, use daily sunscreen, and perform regular self skin exams.  Sun protection was discussed, including avoiding  the mid-day sun, wearing a sunscreen with SPF at least 50, and stressing the need for reapplication of sunscreen and applying more than they think they need.          [1]   Current Outpatient Medications:     amitriptyline (Elavil) 25 mg tablet, , Disp: , Rfl:     amLODIPine (Norvasc) 5 mg tablet, Take 1 tablet (5 mg) by mouth once daily., Disp: 90 tablet, Rfl: 1    cetirizine (ZyrTEC) 10 mg tablet, Take 1 tablet (10 mg) by mouth once daily., Disp: 90 tablet, Rfl: 1    cholecalciferol (Vitamin D-3) 125 MCG (5000 UT) capsule, Take 1 capsule (125 mcg) by mouth once daily., Disp: , Rfl:     clindamycin (Cleocin T) 1 % lotion, Apply topically 2 times a day., Disp: 60 mL, Rfl: 11    cyanocobalamin (Vitamin B-12) 1,000 mcg tablet, Take 3 tablets (3,000 mcg) by mouth once daily., Disp: , Rfl:     diclofenac sodium (Voltaren) 1 % gel gel, Apply 4.5 inches (4 g) topically 4 times a day as needed., Disp: , Rfl:     fluticasone (Flonase) 50 mcg/actuation nasal spray, , Disp: , Rfl:     glucosamine/chondr tubbs A sod (OSTEO BI-FLEX ORAL), Take by mouth., Disp: , Rfl:     levothyroxine (Synthroid, Levoxyl) 25 mcg tablet, Take 1 tab PO daily except 1.5 tablets on Sundays., Disp: 120 tablet, Rfl: 1    metroNIDAZOLE (Metrocream) 0.75 % cream, Apply topically 2 times a day., Disp: , Rfl:     montelukast (Singulair) 10 mg tablet, Take 1 tablet (10 mg) by mouth once daily at bedtime., Disp: 90 tablet, Rfl: 1    multivitamin tablet, Take 1 tablet by mouth once daily. (Patient not taking: Reported on 5/9/2025), Disp: , Rfl:     multivitamin with minerals tablet, Take 1 tablet by mouth once daily., Disp: , Rfl:     NON FORMULARY, Take 5,000 each by mouth once daily. Chano food  5000 units daily, Disp: , Rfl:     omega-3 fatty acids-fish oil (Fish OiL) 360-1,200 mg capsule, Take by mouth., Disp: , Rfl:     omeprazole (PriLOSEC) 40 mg DR capsule, Take 1 capsule (40 mg) by mouth once daily. Do not crush or chew., Disp: 90 capsule, Rfl: 1     sertraline (Zoloft) 50 mg tablet, Take 1 tablet (50 mg) by mouth once daily., Disp: , Rfl:     zinc gluconate 50 mg tablet, Take by mouth once daily., Disp: , Rfl:

## 2025-05-13 NOTE — Clinical Note
Scattered on the patient's back, there are several follicular-based erythematous, inflammatory papules and pustules

## 2025-05-13 NOTE — Clinical Note
[place] Angiomas - the benign nature of these vascular lesions was discussed with the patient today and reassurance provided.  No treatment is medically indicated for these lesions at this time.

## 2025-05-13 NOTE — Clinical Note
Folliculitis -back.  The bacterial nature of this condition and treatment options were discussed with the patient today.  At this time, I recommend topical antibiotic therapy with Clindamycin 1% lotion, which the patient was instructed to apply twice daily to the affected areas or up to 3-4 times per day as needed for active lesions.  The risks, benefits, and side effects of this medication were discussed.  The patient expressed understanding and is in agreement with this plan.

## 2025-05-13 NOTE — Clinical Note
Inflamed Seborrheic Keratosis -left medial mid upper back.  The benign nature of this lesion was discussed with the patient today and reassurance provided.  Given the history the patient provides of frequent irritation and associated symptoms as well as its inflamed appearance on exam today, I offered to treat this lesion with liquid nitrogen cryotherapy.  The patient expressed understanding, is in agreement with this plan, and wishes to proceed with cryotherapy today.

## 2025-05-15 ENCOUNTER — TELEPHONE (OUTPATIENT)
Dept: CARDIOLOGY | Facility: HOSPITAL | Age: 80
End: 2025-05-15
Payer: MEDICARE

## 2025-05-15 LAB
LABORATORY COMMENT REPORT: NORMAL
PATH REPORT.FINAL DX SPEC: NORMAL
PATH REPORT.GROSS SPEC: NORMAL
PATH REPORT.MICROSCOPIC SPEC OTHER STN: NORMAL
PATH REPORT.RELEVANT HX SPEC: NORMAL
PATH REPORT.TOTAL CANCER: NORMAL

## 2025-05-15 NOTE — TELEPHONE ENCOUNTER
Pt agreeable to loop recorder explant w/ Dr. Karla Kim 6/24 10:30am (arrive 9:30am) [facility]. Task routed to Andreea RN for instructions call. Will mail pt reminder packet including BMP lab order.

## 2025-05-20 NOTE — TELEPHONE ENCOUNTER
Patient called in and LVM wanting a call back to confirm Dr. Guerrero appointments and upcoming procedures.     Called patient back and spoke with her at this time and confirmed with her everything that was scheduled with the dates and times also when to get her blood work.    ECHO 5.30.2025 at 11 am in [place]   URBANO 6.2.2025 @ 1pm in [place]  Loop 6.24.2025 @ 10:30 and arrive by 9:30 am and to have this blood work by 6.1.2025  Dr. Guerrero @ 11.11.2025 @ 12:40 pm  Lipid Panel Complete this on or around 8/9/2025    Patient expressed understanding.     Thank you!  Colton TSERN

## 2025-05-24 LAB
ANION GAP SERPL CALCULATED.4IONS-SCNC: 10 MMOL/L (CALC) (ref 7–17)
BUN SERPL-MCNC: 16 MG/DL (ref 7–25)
BUN/CREAT SERPL: NORMAL (CALC) (ref 6–22)
CALCIUM SERPL-MCNC: 9.2 MG/DL (ref 8.6–10.4)
CHLORIDE SERPL-SCNC: 102 MMOL/L (ref 98–110)
CO2 SERPL-SCNC: 27 MMOL/L (ref 20–32)
CREAT SERPL-MCNC: 0.71 MG/DL (ref 0.6–0.95)
EGFRCR SERPLBLD CKD-EPI 2021: 85 ML/MIN/1.73M2
GLUCOSE SERPL-MCNC: 96 MG/DL (ref 65–99)
POTASSIUM SERPL-SCNC: 4.2 MMOL/L (ref 3.5–5.3)
SODIUM SERPL-SCNC: 139 MMOL/L (ref 135–146)

## 2025-05-29 ENCOUNTER — APPOINTMENT (OUTPATIENT)
Dept: AUDIOLOGY | Facility: HOSPITAL | Age: 80
End: 2025-05-29
Payer: MEDICARE

## 2025-05-30 ENCOUNTER — HOSPITAL ENCOUNTER (OUTPATIENT)
Dept: CARDIOLOGY | Facility: HOSPITAL | Age: 80
Discharge: HOME | End: 2025-05-30
Payer: MEDICARE

## 2025-05-30 DIAGNOSIS — I73.9 CLAUDICATION OF BOTH LOWER EXTREMITIES: ICD-10-CM

## 2025-05-30 DIAGNOSIS — E78.5 HYPERLIPIDEMIA, UNSPECIFIED HYPERLIPIDEMIA TYPE: ICD-10-CM

## 2025-05-30 DIAGNOSIS — R07.9 CHEST PAIN, UNSPECIFIED TYPE: ICD-10-CM

## 2025-05-30 DIAGNOSIS — Z95.818 IMPLANTABLE LOOP RECORDER PRESENT: ICD-10-CM

## 2025-05-30 DIAGNOSIS — I44.7 LEFT BUNDLE BRANCH BLOCK: ICD-10-CM

## 2025-05-30 DIAGNOSIS — R06.09 DYSPNEA ON EXERTION: ICD-10-CM

## 2025-05-30 PROCEDURE — 93306 TTE W/DOPPLER COMPLETE: CPT

## 2025-05-30 PROCEDURE — 93306 TTE W/DOPPLER COMPLETE: CPT | Performed by: STUDENT IN AN ORGANIZED HEALTH CARE EDUCATION/TRAINING PROGRAM

## 2025-05-31 LAB
AORTIC VALVE MEAN GRADIENT: 7 MMHG
AORTIC VALVE PEAK VELOCITY: 1.69 M/S
AV PEAK GRADIENT: 11 MMHG
AVA (PEAK VEL): 1.41 CM2
AVA (VTI): 1.33 CM2
EJECTION FRACTION APICAL 4 CHAMBER: 42.8
EJECTION FRACTION: 48 %
GLOBAL LONGITUDINAL STRAIN: 14.5 %
LEFT ATRIUM VOLUME AREA LENGTH INDEX BSA: 34.6 ML/M2
LEFT VENTRICLE INTERNAL DIMENSION DIASTOLE: 4.23 CM (ref 3.5–6)
LEFT VENTRICULAR OUTFLOW TRACT DIAMETER: 1.7 CM
LV EJECTION FRACTION BIPLANE: 48 %
MITRAL VALVE E/A RATIO: 0.6
MITRAL VALVE E/E' RATIO: 9.45
RIGHT VENTRICLE FREE WALL PEAK S': 13.1 CM/S
RIGHT VENTRICLE PEAK SYSTOLIC PRESSURE: 30 MMHG
TRICUSPID ANNULAR PLANE SYSTOLIC EXCURSION: 2.1 CM

## 2025-06-02 ENCOUNTER — HOSPITAL ENCOUNTER (OUTPATIENT)
Dept: VASCULAR MEDICINE | Facility: CLINIC | Age: 80
Discharge: HOME | End: 2025-06-02
Payer: MEDICARE

## 2025-06-02 DIAGNOSIS — I44.7 LEFT BUNDLE BRANCH BLOCK: ICD-10-CM

## 2025-06-02 DIAGNOSIS — R06.09 DYSPNEA ON EXERTION: ICD-10-CM

## 2025-06-02 DIAGNOSIS — E78.5 HYPERLIPIDEMIA, UNSPECIFIED HYPERLIPIDEMIA TYPE: ICD-10-CM

## 2025-06-02 DIAGNOSIS — R07.9 CHEST PAIN, UNSPECIFIED TYPE: ICD-10-CM

## 2025-06-02 DIAGNOSIS — Z95.818 IMPLANTABLE LOOP RECORDER PRESENT: ICD-10-CM

## 2025-06-02 DIAGNOSIS — I73.9 CLAUDICATION OF BOTH LOWER EXTREMITIES: ICD-10-CM

## 2025-06-02 PROCEDURE — 93923 UPR/LXTR ART STDY 3+ LVLS: CPT

## 2025-06-02 PROCEDURE — 93923 UPR/LXTR ART STDY 3+ LVLS: CPT | Performed by: SURGERY

## 2025-06-03 NOTE — RESULT ENCOUNTER NOTE
Abnormal results, need to follow up in 1-2 months. RN to facilitate.  Believe we have discussed this before needs a CTA prior to follow-up

## 2025-06-04 ENCOUNTER — TELEPHONE (OUTPATIENT)
Dept: CARDIOLOGY | Facility: HOSPITAL | Age: 80
End: 2025-06-04
Payer: MEDICARE

## 2025-06-04 DIAGNOSIS — R68.89 ABNORMAL ANKLE BRACHIAL INDEX (ABI): Primary | ICD-10-CM

## 2025-06-04 NOTE — TELEPHONE ENCOUNTER
Called patient with results of echo and URBANO with plan.  She is agreeable to the CTA but is concerned because she is claustrophobic.  Advised her to reach out to PCP for possible premedication.  Message to Mirella to assist with scheduling.

## 2025-06-04 NOTE — TELEPHONE ENCOUNTER
----- Message from Alvaro Guerrero sent at 6/3/2025  4:09 PM EDT -----  Abnormal results, need to follow up in 1-2 months. RN to facilitate.  Believe we have discussed this before needs a CTA prior to follow-up  ----- Message -----  From: Interface, [facility] - Cardiology Results In  Sent: 6/3/2025   3:59 PM EDT  To: Alvaro Guerrero MD

## 2025-06-05 ENCOUNTER — TELEMEDICINE (OUTPATIENT)
Dept: PRIMARY CARE | Facility: CLINIC | Age: 80
End: 2025-06-05
Payer: MEDICARE

## 2025-06-05 DIAGNOSIS — F41.8 SITUATIONAL ANXIETY: Primary | ICD-10-CM

## 2025-06-05 DIAGNOSIS — N18.2 STAGE 2 CHRONIC KIDNEY DISEASE: ICD-10-CM

## 2025-06-05 DIAGNOSIS — Z95.818 IMPLANTABLE LOOP RECORDER PRESENT: ICD-10-CM

## 2025-06-05 PROCEDURE — 1159F MED LIST DOCD IN RCRD: CPT | Performed by: FAMILY MEDICINE

## 2025-06-05 PROCEDURE — G2211 COMPLEX E/M VISIT ADD ON: HCPCS | Performed by: FAMILY MEDICINE

## 2025-06-05 PROCEDURE — 1036F TOBACCO NON-USER: CPT | Performed by: FAMILY MEDICINE

## 2025-06-05 PROCEDURE — 1160F RVW MEDS BY RX/DR IN RCRD: CPT | Performed by: FAMILY MEDICINE

## 2025-06-05 PROCEDURE — 99214 OFFICE O/P EST MOD 30 MIN: CPT | Performed by: FAMILY MEDICINE

## 2025-06-05 RX ORDER — LORAZEPAM 0.5 MG/1
0.5 TABLET ORAL 2 TIMES DAILY PRN
Qty: 30 TABLET | Refills: 0 | Status: CANCELLED
Start: 2025-06-05 | End: 2025-09-03

## 2025-06-05 RX ORDER — DIAZEPAM 2 MG/1
2 TABLET ORAL
Qty: 1 TABLET | Refills: 0 | Status: SHIPPED | OUTPATIENT
Start: 2025-06-05 | End: 2025-06-05

## 2025-06-05 ASSESSMENT — ENCOUNTER SYMPTOMS
FEVER: 0
COUGH: 0
NERVOUS/ANXIOUS: 1

## 2025-06-05 NOTE — PROGRESS NOTES
Subjective   Patient ID: Mary Atwood is a 80 y.o. female who presents for No chief complaint on file..    Patient consents to this telemedicine appointment and acknowledges its limitations.    I performed this visit using real-time telehealth tools, including an audio/video connection between Christine Atwood at home and Dorothea Francois DO located at  [facility] in [place], [place].    Length of telemedicine visit: 13:21 minutes      Mary was evaluated by cardiology for shortness of breath and recurrent vasovagal syncope.  She is scheduled for a CT angiogram.  She reports that she gets very anxious when she has these types of test.  She is requesting medication to take to help her cope with anxiety during the test.  She has been given benzodiazepines in the past and did not have any issues.  Her  will be driving her to and from the appointment.    She also would like to discuss her stage II kidney disease.  She is curious about what this means and implications.    Also, she is scheduled to get her implantable loop recorder removed later this month.         Review of Systems   Constitutional:  Negative for fever.   HENT:  Negative for congestion.    Respiratory:  Negative for cough.    Psychiatric/Behavioral:  The patient is nervous/anxious.        Objective   There were no vitals taken for this visit.    Physical Exam  Constitutional:       General: She is not in acute distress.     Appearance: Normal appearance.   HENT:      Head: Normocephalic.   Pulmonary:      Effort: Pulmonary effort is normal.   Neurological:      General: No focal deficit present.      Mental Status: She is alert.   Psychiatric:         Mood and Affect: Mood normal.         Assessment/Plan   Diagnoses and all orders for this visit:  Situational anxiety  Comments:  Take Valium 2 mg 15 minutes before test.  Orders:  -     diazePAM (Valium) 2 mg tablet; Take 1 tablet (2 mg) by mouth 1 time if needed for anxiety for up to 1  dose.  Stage 2 chronic kidney disease  Comments:  Most recent GFR 85.  Discussed good blood pressure control and hydration to help prevent further kidney function decline.  Implantable loop recorder present  Comments:  Patient scheduled for removal later this month.

## 2025-06-05 NOTE — TELEPHONE ENCOUNTER
CT angio is scheduled for 6/20 at 9:15am in [place].   PT needs to get blood work done before this test.   PT also needs scheduled for a follow up with  1 to 2 months after this test is completed.   If pt calls the office please get them scheduled for follow up with  and and let pt know that blood work needs to be completed before hand.

## 2025-06-11 ENCOUNTER — TELEPHONE (OUTPATIENT)
Dept: CARDIOLOGY | Facility: HOSPITAL | Age: 80
End: 2025-06-11
Payer: MEDICARE

## 2025-06-11 NOTE — TELEPHONE ENCOUNTER
Pt returned Mirella's call to confirm CT scheduled 6/20. Confirmed w/ Rody WILLIAMSON that pt is up to date on labs as she completed BMP 5/23. Per Mirella, ok to move pt follow up w/ Dr. Guerrero from 6/26 to 8/5 12:20pm. Pt verbalized understanding and agreeable to plan.

## 2025-06-17 ENCOUNTER — TELEPHONE (OUTPATIENT)
Dept: CARDIOLOGY | Facility: HOSPITAL | Age: 80
End: 2025-06-17
Payer: MEDICARE

## 2025-06-17 NOTE — TELEPHONE ENCOUNTER
The patient is scheduled for an implantable loop recorder explant with Dr. Acosta on 6/24/2025 at [place] with arrival time of 9:30. Labs are up to date.  Nothing to eat or drink after midnight the night before the procedure. Take morning medication with a sip of water. Patient will need a ride home. The patient verbalized understanding of instructions. All questions answered.

## 2025-06-20 ENCOUNTER — HOSPITAL ENCOUNTER (OUTPATIENT)
Dept: RADIOLOGY | Facility: HOSPITAL | Age: 80
Discharge: HOME | End: 2025-06-20
Payer: MEDICARE

## 2025-06-20 DIAGNOSIS — R68.89 ABNORMAL ANKLE BRACHIAL INDEX (ABI): ICD-10-CM

## 2025-06-20 PROCEDURE — 2550000001 HC RX 255 CONTRASTS: Performed by: INTERNAL MEDICINE

## 2025-06-20 PROCEDURE — 75635 CT ANGIO ABDOMINAL ARTERIES: CPT

## 2025-06-20 RX ADMIN — IOHEXOL 90 ML: 350 INJECTION, SOLUTION INTRAVENOUS at 09:42

## 2025-06-23 ENCOUNTER — TELEPHONE (OUTPATIENT)
Dept: CARDIOLOGY | Facility: HOSPITAL | Age: 80
End: 2025-06-23
Payer: MEDICARE

## 2025-06-24 ENCOUNTER — HOSPITAL ENCOUNTER (OUTPATIENT)
Dept: CARDIOLOGY | Facility: HOSPITAL | Age: 80
Discharge: HOME | End: 2025-06-24
Payer: MEDICARE

## 2025-06-24 VITALS
HEIGHT: 63 IN | OXYGEN SATURATION: 95 % | DIASTOLIC BLOOD PRESSURE: 54 MMHG | HEART RATE: 124 BPM | WEIGHT: 170 LBS | TEMPERATURE: 97.9 F | BODY MASS INDEX: 30.12 KG/M2 | RESPIRATION RATE: 10 BRPM | SYSTOLIC BLOOD PRESSURE: 154 MMHG

## 2025-06-24 DIAGNOSIS — R55 SYNCOPE AND COLLAPSE: ICD-10-CM

## 2025-06-24 LAB — BODY SURFACE AREA: 1.84 M2

## 2025-06-24 PROCEDURE — 7100000009 HC PHASE TWO TIME - INITIAL BASE CHARGE

## 2025-06-24 PROCEDURE — 2500000004 HC RX 250 GENERAL PHARMACY W/ HCPCS (ALT 636 FOR OP/ED): Performed by: NURSE PRACTITIONER

## 2025-06-24 PROCEDURE — 7100000010 HC PHASE TWO TIME - EACH INCREMENTAL 1 MINUTE

## 2025-06-24 PROCEDURE — 33286 RMVL SUBQ CAR RHYTHM MNTR: CPT

## 2025-06-24 PROCEDURE — 33286 RMVL SUBQ CAR RHYTHM MNTR: CPT | Performed by: INTERNAL MEDICINE

## 2025-06-24 PROCEDURE — 99221 1ST HOSP IP/OBS SF/LOW 40: CPT | Performed by: NURSE PRACTITIONER

## 2025-06-24 PROCEDURE — 2500000004 HC RX 250 GENERAL PHARMACY W/ HCPCS (ALT 636 FOR OP/ED): Performed by: INTERNAL MEDICINE

## 2025-06-24 PROCEDURE — 2500000005 HC RX 250 GENERAL PHARMACY W/O HCPCS: Performed by: NURSE PRACTITIONER

## 2025-06-24 RX ORDER — SODIUM CHLORIDE 9 MG/ML
30 INJECTION, SOLUTION INTRAVENOUS CONTINUOUS
Status: ACTIVE | OUTPATIENT
Start: 2025-06-24 | End: 2025-06-24

## 2025-06-24 RX ORDER — LIDOCAINE HYDROCHLORIDE 20 MG/ML
INJECTION, SOLUTION INFILTRATION; PERINEURAL AS NEEDED
Status: DISCONTINUED | OUTPATIENT
Start: 2025-06-24 | End: 2025-06-24 | Stop reason: HOSPADM

## 2025-06-24 RX ORDER — VANCOMYCIN HYDROCHLORIDE 1 G/200ML
1000 INJECTION, SOLUTION INTRAVENOUS ONCE
Status: COMPLETED | OUTPATIENT
Start: 2025-06-24 | End: 2025-06-24

## 2025-06-24 RX ORDER — CHLORHEXIDINE GLUCONATE 40 MG/ML
SOLUTION TOPICAL ONCE
Status: COMPLETED | OUTPATIENT
Start: 2025-06-24 | End: 2025-06-24

## 2025-06-24 RX ADMIN — VANCOMYCIN HYDROCHLORIDE 1000 MG: 1 INJECTION, SOLUTION INTRAVENOUS at 10:31

## 2025-06-24 RX ADMIN — Medication: at 10:01

## 2025-06-24 RX ADMIN — LIDOCAINE HYDROCHLORIDE 10 ML: 20 INJECTION, SOLUTION INFILTRATION; PERINEURAL at 13:27

## 2025-06-24 RX ADMIN — SODIUM CHLORIDE 30 ML/HR: 9 INJECTION, SOLUTION INTRAVENOUS at 09:58

## 2025-06-24 ASSESSMENT — ENCOUNTER SYMPTOMS
CARDIOVASCULAR NEGATIVE: 1
CONSTITUTIONAL NEGATIVE: 1
RESPIRATORY NEGATIVE: 1

## 2025-06-24 ASSESSMENT — PAIN - FUNCTIONAL ASSESSMENT: PAIN_FUNCTIONAL_ASSESSMENT: 0-10

## 2025-06-24 NOTE — H&P
"History Of Present Illness  Christien Atwood \"Peg" is a 80 y.o. female presenting with vasovagal syncope status post implantable loop recorder approximately 5 years ago.  Patient has a significant past medical history for vasovagal syncope, hypertension, hyperlipidemia celiac disease, arthritis and GERD.  She is a patient of Dr. Krishna.  She was last seen in the office in May 2025.  She had a tilt test done that was negative and occasionally during syncopal episodes she has bradycardia found on the implantable loop recorder. In the past year she has been asymptomatic.  Patient reports that she would like her loop recorder removed.  She said that it is moving around a lot in the chest and does not see the benefits of having it in anymore due to being asymptomatic for over a year.  The patient denies any issues in the past with moderate sedation, she does prefer local anesthetic but is willing to have moderate sedation if she has any issues tolerating the procedure with local only.  We discussed care of her site after the procedure and when to notify cardiology or myself if she has any issues with her site.     Past Medical History  Medical History[1]    Surgical History  Surgical History[2]     Social History  She reports that she has never smoked. She has never used smokeless tobacco. She reports that she does not currently use alcohol. She reports that she does not use drugs.    Family History  Family History[3]     Allergies  Amoxicillin, Biaxin [clarithromycin], Cephalexin, Clindamycin, and Penicillins    Review of Systems   Constitutional: Negative.    Respiratory: Negative.     Cardiovascular: Negative.    All other systems reviewed and are negative.       Physical Exam  Vitals reviewed.   Constitutional:       Appearance: Normal appearance.   HENT:      Head: Normocephalic.   Cardiovascular:      Rate and Rhythm: Normal rate and regular rhythm.      Pulses: Normal pulses.      Heart sounds: Normal heart " "sounds.   Pulmonary:      Effort: Pulmonary effort is normal.      Breath sounds: Normal breath sounds.   Musculoskeletal:      Right lower leg: No edema.      Left lower leg: No edema.   Skin:     General: Skin is warm and dry.      Capillary Refill: Capillary refill takes less than 2 seconds.   Neurological:      General: No focal deficit present.      Mental Status: She is alert and oriented to person, place, and time.   Psychiatric:         Mood and Affect: Mood normal.         Behavior: Behavior normal.          Last Recorded Vitals  Blood pressure 154/54, pulse (!) 124, temperature 36.6 °C (97.9 °F), temperature source Temporal, resp. rate 10, height 1.588 m (5' 2.52\"), weight 77.1 kg (170 lb), SpO2 95%.    Relevant Results       Assessment & Plan  Syncope and collapse      Plan: Loop recorder removal with local anesthetic    I spent 35 minutes in the professional and overall care of this patient.      Pilar Parra, APRN-CNP         [1]   Past Medical History:  Diagnosis Date    Hypertension     Hypothyroidism    [2]   Past Surgical History:  Procedure Laterality Date    APPENDECTOMY      BREAST BIOPSY      HYSTERECTOMY     [3]   Family History  Problem Relation Name Age of Onset    Epilepsy Mother      Cirrhosis Mother      Dementia Half-Sister      Lymphoma Half-Sister      Hypertension Half-Sister      Atrial fibrillation Half-Brother      Hyperlipidemia Half-Brother       "

## 2025-06-24 NOTE — DISCHARGE INSTRUCTIONS
You are all finished with your loop recorder removal     Please leave your dressing on for three days. Do not get it wet.     After three days from your procedure you may remove your dressing, gently clean the area with soap and water, rinse and dry. Leave uncovered.     Watch out for signs and symptoms of infection and Notify Dr. Acosta's office at [phone] or the Cath Lab NP Magali Parra at [phone] which could include any of the following:   Redness, drainage, fever, chills or increased pain at the site.     If you have any pain you can use over the counter medications and/or ice as needed     Please call if you have any questions, MANDEEP Parra [phone]

## 2025-06-26 ENCOUNTER — PATIENT MESSAGE (OUTPATIENT)
Dept: PRIMARY CARE | Facility: CLINIC | Age: 80
End: 2025-06-26
Payer: MEDICARE

## 2025-06-26 ENCOUNTER — APPOINTMENT (OUTPATIENT)
Dept: CARDIOLOGY | Facility: HOSPITAL | Age: 80
End: 2025-06-26
Payer: MEDICARE

## 2025-06-26 DIAGNOSIS — F41.9 ANXIETY: ICD-10-CM

## 2025-06-27 RX ORDER — SERTRALINE HYDROCHLORIDE 50 MG/1
50 TABLET, FILM COATED ORAL DAILY
Qty: 90 TABLET | Refills: 1 | Status: SHIPPED | OUTPATIENT
Start: 2025-06-27

## 2025-07-28 ENCOUNTER — APPOINTMENT (OUTPATIENT)
Dept: AUDIOLOGY | Facility: HOSPITAL | Age: 80
End: 2025-07-28
Payer: MEDICARE

## 2025-07-28 DIAGNOSIS — H90.3 SENSORINEURAL HEARING LOSS, BILATERAL: Primary | ICD-10-CM

## 2025-07-28 PROCEDURE — 92567 TYMPANOMETRY: CPT | Performed by: AUDIOLOGIST

## 2025-07-28 PROCEDURE — 92557 COMPREHENSIVE HEARING TEST: CPT | Performed by: AUDIOLOGIST

## 2025-07-28 PROCEDURE — V5299 HEARING SERVICE: HCPCS | Mod: AUDSP | Performed by: AUDIOLOGIST

## 2025-07-28 ASSESSMENT — PAIN SCALES - GENERAL: PAINLEVEL_OUTOF10: 3

## 2025-07-28 ASSESSMENT — PAIN - FUNCTIONAL ASSESSMENT: PAIN_FUNCTIONAL_ASSESSMENT: 0-10

## 2025-07-28 NOTE — PROGRESS NOTES
"AUDIOLOGY ADULT AUDIOMETRIC EVALUATION  AND HEARING AID CHECK      Name:  Christine Puckett"  :  1945  Age:  80 y.o.  Date of Evaluation:  2025      IMPRESSIONS:  Today's test results are consistent with mild to moderately severe sensorineural hearing loss with fair to good word discrimination and normal tympanic membrane mobility bilaterally.  Relative to previous results, thresholds are stable, but there is a slight progression in word discrimination scores.      RECOMMENDATIONS:  -Annual audiologic monitoring, or as changes are noted.  -Patient to consider replacement of hearing aids if any changes or problems are noticed going forward, as the current devices and the rechargeable batteries are near their end of life.  -Daily use of hearing aids.  -Daily maintenance to be performed at home.   -Follow-up hearing aid check in 6-12 months or as needed.  Appointment is scheduled on 2026 at 10:30am.   ------------------------------------------------    HISTORY:  Reason for visit:  Christine Puckett" is seen today at the request Dorothea Francois DO for an evaluation of hearing.  Patient complains of Hearing Loss  Reports worsening hearing abilities, struggles to hear some speakers particularly in noisy environments.          Dizziness:  yes, feels off balance, chronic, no spinning  Hearing aid history: yes, longstanding bilaterally, aids had been malfunctioning previously but devices have been working well recently   Family history of hearing loss:  yes, brother with hearing loss in older adulthood   Otitis Media: yes, in childhood, none in adulthood    Previous hearing test:  yes, most recent 2024, showing: mild to moderately severe sensorineural hearing loss with good to excellent word discrimination and normal tympanic membrane mobility bilaterally.  Her hearing sensitivity is stable relative to previous test of 3-2-2022.      Otalgia:  no  Aural Fullness:  no  PE Tubes:  no  " "Other otologic surgical history:  no  Tinnitus:   no  Noise Exposure: no  Other significant history: none    EVALUATION    Otoscopic Evaluation:        Right ear: partially occluding cerumen, tympanic membrane not well visualized       Left ear:  clear ear canal, tympanic membrane visualized              Pure Tone Audiometry:  Conventional audiometry (125-8000Hz) via TDH headphones with good response reliability      Both ears:  mild to moderately severe sensorineural hearing loss     Speech Audiometry:        Both ears:  Speech Reception Threshold (SRT) was obtained at 35 dBHL                 Word Recognition scores were good in the right ear and fair in the left ear at 40dBSL re: SRT            Aided (binaural) word discrimination score was good at 50dBHL (60dBA)    Immittance Measures: 226Hz       Both ears:  Tympanogram shows normal middle ear pressure, static compliance, and ear canal volume.     Did not test acoustic reflexes, previously absent            Distortion Product Otoacoustic Emissions: Assesses the cochlear outer hair cell function (6289-5216 Hz frequency range)        Did not test, known longstanding sensorineural hearing loss         Hearing Aid Check    Current hearing aids:  Hearing Aids:  Phonak [employer] M50R  Right # [id number]    Left # [id number]  Speakers: #0M (4.0)   Domes: medium vented    [place]  Repair/loss warranty ends:     Upon initial examination, the hearing aids sounded weak.      Cleaned and checked both devices.    Replaced filters bilaterally.  Replaced domes bilaterally.     Both hearing aids sound and look good on final examination.  Patient reported that hearing aids sounded much better after maintenance procedures.      PATIENT EDUCATION:   Discussed results and recommendations with Christine Atwood \"Mary\".  Questions were addressed and the patient was encouraged to contact our department should concerns arise.    Verified " patient's identification verbally and by armband.    Time:  10:24 to 11:12    FRANCESCA Topete, CCC-A  Senior Audiologist

## 2025-07-28 NOTE — LETTER
"  2025         Dorothea Francois DO  [address]  [address] [address]      Patient: Mary Atwood   YOB: 1945   Date of Visit: 2025       Dear Dorothea Francois DO:    Thank you for referring Mary Atwood to me for evaluation. Below are my notes for this consultation.  If you have questions, please do not hesitate to call me. I look forward to following your patient along with you.       Sincerely,     FRANCESCA Topete, CCC-A  Senior Audiologist      CC:   No Recipients  ______________________________________________________________________________________    AUDIOLOGY ADULT AUDIOMETRIC EVALUATION  AND HEARING AID CHECK      Name:  Christine Atwood \"Peg"  :  1945  Age:  80 y.o.  Date of Evaluation:  2025      IMPRESSIONS:  Today's test results are consistent with mild to moderately severe sensorineural hearing loss with fair to good word discrimination and normal tympanic membrane mobility bilaterally.  Relative to previous results, thresholds are stable, but there is a slight progression in word discrimination scores.      RECOMMENDATIONS:  -Annual audiologic monitoring, or as changes are noted.  -Patient to consider replacement of hearing aids if any changes or problems are noticed going forward, as the current devices and the rechargeable batteries are near their end of life.  -Daily use of hearing aids.  -Daily maintenance to be performed at home.   -Follow-up hearing aid check in 6-12 months or as needed.  Appointment is scheduled on 2026 at 10:30am.   ------------------------------------------------    HISTORY:  Reason for visit:  Christine Law"Peg" is seen today at the request Dorothea M Jergovich, DO for an evaluation of hearing.  Patient complains of Hearing Loss  Reports worsening hearing abilities, struggles to hear some speakers particularly in noisy environments.          Dizziness:  yes, feels off balance, chronic, no " spinning  Hearing aid history: yes, longstanding bilaterally, aids had been malfunctioning previously but devices have been working well recently   Family history of hearing loss:  yes, brother with hearing loss in older adulthood   Otitis Media: yes, in childhood, none in adulthood    Previous hearing test:  yes, most recent 7-, showing: mild to moderately severe sensorineural hearing loss with good to excellent word discrimination and normal tympanic membrane mobility bilaterally.  Her hearing sensitivity is stable relative to previous test of 3-2-2022.      Otalgia:  no  Aural Fullness:  no  PE Tubes:  no  Other otologic surgical history:  no  Tinnitus:   no  Noise Exposure: no  Other significant history: none    EVALUATION    Otoscopic Evaluation:        Right ear: partially occluding cerumen, tympanic membrane not well visualized       Left ear:  clear ear canal, tympanic membrane visualized              Pure Tone Audiometry:  Conventional audiometry (125-8000Hz) via TDH headphones with good response reliability      Both ears:  mild to moderately severe sensorineural hearing loss     Speech Audiometry:        Both ears:  Speech Reception Threshold (SRT) was obtained at 35 dBHL                 Word Recognition scores were good in the right ear and fair in the left ear at 40dBSL re: SRT            Aided (binaural) word discrimination score was good at 50dBHL (60dBA)    Immittance Measures: 226Hz       Both ears:  Tympanogram shows normal middle ear pressure, static compliance, and ear canal volume.     Did not test acoustic reflexes, previously absent            Distortion Product Otoacoustic Emissions: Assesses the cochlear outer hair cell function (9292-4089 Hz frequency range)        Did not test, known longstanding sensorineural hearing loss         Hearing Aid Check    Current hearing aids:  Hearing Aids:  Phonak [employer] M50R  Right # [id number]    Left # [id number]  Speakers: #0M (4.0)   Domes: medium  "vented    [place]ased[place], [place]  Repair/loss warranty ends:     Upon initial examination, the hearing aids sounded weak.      Cleaned and checked both devices.    Replaced filters bilaterally.  Replaced domes bilaterally.     Both hearing aids sound and look good on final examination.  Patient reported that hearing aids sounded much better after maintenance procedures.      PATIENT EDUCATION:   Discussed results and recommendations with Christine Atwood \"Mary\".  Questions were addressed and the patient was encouraged to contact our department should concerns arise.    Verified patient's identification verbally and by armband.    Time:  10:24 to 11:12    FRANCESCA Topete, CCC-A  Senior Audiologist        "

## 2025-08-05 ENCOUNTER — OFFICE VISIT (OUTPATIENT)
Dept: CARDIOLOGY | Facility: HOSPITAL | Age: 80
End: 2025-08-05
Payer: MEDICARE

## 2025-08-05 VITALS
HEIGHT: 63 IN | DIASTOLIC BLOOD PRESSURE: 70 MMHG | SYSTOLIC BLOOD PRESSURE: 122 MMHG | WEIGHT: 166 LBS | OXYGEN SATURATION: 98 % | BODY MASS INDEX: 29.41 KG/M2 | HEART RATE: 73 BPM

## 2025-08-05 DIAGNOSIS — I73.9 PERIPHERAL VASCULAR DISEASE: ICD-10-CM

## 2025-08-05 DIAGNOSIS — I73.9 CLAUDICATION OF BOTH LOWER EXTREMITIES: ICD-10-CM

## 2025-08-05 DIAGNOSIS — E78.49 OTHER HYPERLIPIDEMIA: Primary | ICD-10-CM

## 2025-08-05 PROCEDURE — 99215 OFFICE O/P EST HI 40 MIN: CPT | Performed by: INTERNAL MEDICINE

## 2025-08-05 PROCEDURE — 99212 OFFICE O/P EST SF 10 MIN: CPT

## 2025-08-05 PROCEDURE — 1159F MED LIST DOCD IN RCRD: CPT | Performed by: INTERNAL MEDICINE

## 2025-08-05 PROCEDURE — 3078F DIAST BP <80 MM HG: CPT | Performed by: INTERNAL MEDICINE

## 2025-08-05 PROCEDURE — 3074F SYST BP LT 130 MM HG: CPT | Performed by: INTERNAL MEDICINE

## 2025-08-05 RX ORDER — CILOSTAZOL 50 MG/1
50 TABLET ORAL 2 TIMES DAILY
Qty: 60 TABLET | Refills: 11 | Status: SHIPPED | OUTPATIENT
Start: 2025-08-05 | End: 2026-08-05

## 2025-08-05 RX ORDER — CLOPIDOGREL BISULFATE 75 MG/1
75 TABLET ORAL DAILY
Qty: 30 TABLET | Refills: 11 | Status: SHIPPED | OUTPATIENT
Start: 2025-08-05 | End: 2026-08-05

## 2025-08-05 NOTE — PROGRESS NOTES
"Chief Complaint:   Follow-up (CT)     History Of Present Illness:    Mary Atwood is a 80 y.o. female presenting follow-up with her  about abnormal URBANO.    She has a past medical history of hypertension, hyperlipidemia, recurrent vasovagal syncope with implantable loop recorder that was recently explanted.  Even though tilt test was negative occasionally during syncopal episode she has had bradycardic episodes on the ILR.  In the past 1 year she has been asymptomatic since being started on sertraline she states.  S   She states that her hips hurt and the calf muscles hurt when she is trying to go up the stairs.  No had any history of cardiac disease.  URBANO and CTA lower extremity was abnormal.     Her EKG shows sinus rhythm with left bundle branch block which is pre-existing..     Last Recorded Vitals:  Vitals:    08/05/25 1212   BP: 122/70   BP Location: Left arm   Pulse: 73   SpO2: 98%   Weight: 75.3 kg (166 lb)   Height: 1.588 m (5' 2.5\")       Past Medical History:  She has a past medical history of Hypertension and Hypothyroidism.    Past Surgical History:  She has a past surgical history that includes Hysterectomy; Appendectomy; and Breast biopsy.      Social History:  She reports that she has never smoked. She has never used smokeless tobacco. She reports that she does not currently use alcohol. She reports that she does not use drugs.    Family History:  Family History[1]     Allergies:  Amoxicillin, Biaxin [clarithromycin], Cephalexin, Clindamycin, and Penicillins    Outpatient Medications:  Current Outpatient Medications   Medication Instructions    amLODIPine (NORVASC) 5 mg, oral, Daily    cetirizine (ZYRTEC) 10 mg, oral, Daily    cholecalciferol (VITAMIN D-3) 125 mcg, Daily    clindamycin (Cleocin T) 1 % lotion Topical, 2 times daily    cyanocobalamin (VITAMIN B-12) 3,000 mcg, Daily    diazePAM (VALIUM) 2 mg, oral, Once PRN Procedure    diclofenac sodium (VOLTAREN) 4 g, 4 times daily PRN    " fluticasone (Flonase) 50 mcg/actuation nasal spray     glucosamine/chondr tubbs A sod (OSTEO BI-FLEX ORAL) Take by mouth.    levothyroxine (Synthroid, Levoxyl) 25 mcg tablet Take 1 tab PO daily except 1.5 tablets on Sundays.    metroNIDAZOLE (Metrocream) 0.75 % cream 2 times daily    montelukast (SINGULAIR) 10 mg, oral, Nightly    multivitamin tablet 1 tablet, Daily    multivitamin with minerals tablet 1 tablet, Daily    NON FORMULARY 5,000 each, Daily    omega-3 fatty acids-fish oil (Fish OiL) 360-1,200 mg capsule Take by mouth.    omeprazole (PRILOSEC) 40 mg, oral, Daily, Do not crush or chew.    sertraline (ZOLOFT) 50 mg, oral, Daily    zinc gluconate 50 mg tablet Daily       Physical Exam:  Physical Exam  Vitals reviewed.   Constitutional:       Appearance: Normal appearance.   Neck:      Vascular: No carotid bruit or JVD.     Cardiovascular:      Rate and Rhythm: Normal rate and regular rhythm.      Pulses: Normal pulses.      Heart sounds: Normal heart sounds, S1 normal and S2 normal.   Pulmonary:      Effort: Pulmonary effort is normal. No respiratory distress.      Breath sounds: No wheezing or rales.   Abdominal:      General: Abdomen is flat.      Palpations: Abdomen is soft.     Musculoskeletal:      Right lower leg: No edema.      Left lower leg: No edema.     Skin:     General: Skin is warm.     Neurological:      Mental Status: She is alert and oriented to person, place, and time. Mental status is at baseline.     Psychiatric:         Mood and Affect: Mood normal.         Behavior: Behavior normal.           Last Labs:  CBC -  Lab Results   Component Value Date    WBC 6.6 03/10/2025    HGB 13.5 03/10/2025    HCT 40.7 03/10/2025    MCV 92.9 03/10/2025     03/10/2025       CMP -  Lab Results   Component Value Date    CALCIUM 9.2 05/23/2025    PROT 7.2 03/10/2025    ALBUMIN 4.2 03/10/2025    AST 15 03/10/2025    ALT 16 03/10/2025    ALKPHOS 59 03/10/2025    BILITOT 0.5 03/10/2025       LIPID PANEL -    Lab Results   Component Value Date    CHOL 272 (H) 03/10/2025    TRIG 273 (H) 03/10/2025    HDL 48 (L) 03/10/2025    CHHDL 5.7 (H) 03/10/2025    VLDL 72 (H) 09/10/2024    NHDL 224 (H) 03/10/2025       RENAL FUNCTION PANEL -   Lab Results   Component Value Date    GLUCOSE 96 05/23/2025     05/23/2025    K 4.2 05/23/2025     05/23/2025    CO2 27 05/23/2025    ANIONGAP 10 05/23/2025    BUN 16 05/23/2025    CREATININE 0.71 05/23/2025    CALCIUM 9.2 05/23/2025    ALBUMIN 4.2 03/10/2025        Lab Results   Component Value Date    HGBA1C 6.3 (H) 03/10/2025       Last Cardiology Tests:  ECG:  ECG 12 Lead 05/09/2025      Echo:  Transthoracic Echo Complete 05/30/2025      Ejection Fractions:  EF   Date/Time Value Ref Range Status   05/30/2025 11:58 AM 48 %        Cath:  No results found for this or any previous visit from the past 1095 days.      Stress Test:  No results found for this or any previous visit from the past 1095 days.      Cardiac Imaging:  No results found for this or any previous visit from the past 1095 days.          Assessment/Plan     1-history of vasovagal syncope-requesting ILR removal will refer to EP team and message was sent to schedule procedure.     Discussed small risk of bleeding and scar formation.     2-claudication type of symptoms with hyperlipidemia and hypertension and abnormal peripheral pulses on the right-arrange -URBANO was abnormal consistent with peripheral vascular disease.  Start Plavix, high-dose potent statins and Pletal and enroll in cardiopulmonary rehab.  If symptoms persist after 3 to 6 months arrange for intervention to the lower extremity.    3-left bundle branch block-echocardiogram with low normal LV function around 50%.    9-jvlbccdmtlaryp-wp are starting statins due to PVD, target LDL less than 70.  Will need follow-up blood work during visit with our DYLON in 4 months.      Alvaro Guerrero MD         [1]   Family History  Problem Relation Name Age of Onset     Epilepsy Mother      Cirrhosis Mother      Dementia Half-Sister      Lymphoma Half-Sister      Hypertension Half-Sister      Atrial fibrillation Half-Brother      Hyperlipidemia Half-Brother

## 2025-08-09 DIAGNOSIS — R06.09 DYSPNEA ON EXERTION: ICD-10-CM

## 2025-08-09 DIAGNOSIS — I44.7 LEFT BUNDLE BRANCH BLOCK: ICD-10-CM

## 2025-08-09 DIAGNOSIS — R07.9 CHEST PAIN, UNSPECIFIED TYPE: ICD-10-CM

## 2025-08-09 DIAGNOSIS — Z95.818 IMPLANTABLE LOOP RECORDER PRESENT: ICD-10-CM

## 2025-08-09 DIAGNOSIS — I73.9 CLAUDICATION OF BOTH LOWER EXTREMITIES: ICD-10-CM

## 2025-08-09 DIAGNOSIS — E78.5 HYPERLIPIDEMIA, UNSPECIFIED HYPERLIPIDEMIA TYPE: ICD-10-CM

## 2025-08-11 DIAGNOSIS — I70.211 INTERMITTENT CLAUDICATION OF RIGHT LOWER EXTREMITY DUE TO ATHEROSCLEROSIS: Primary | ICD-10-CM

## 2025-08-22 DIAGNOSIS — E03.9 ACQUIRED HYPOTHYROIDISM: Chronic | ICD-10-CM

## 2025-08-22 RX ORDER — LEVOTHYROXINE SODIUM 25 UG/1
TABLET ORAL
Qty: 98 TABLET | Refills: 0 | Status: SHIPPED | OUTPATIENT
Start: 2025-08-22

## 2025-09-02 ENCOUNTER — CLINICAL SUPPORT (OUTPATIENT)
Dept: CARDIAC REHAB | Facility: HOSPITAL | Age: 80
End: 2025-09-02
Payer: MEDICARE

## 2025-09-02 VITALS
HEIGHT: 62 IN | BODY MASS INDEX: 30.36 KG/M2 | RESPIRATION RATE: 18 BRPM | OXYGEN SATURATION: 97 % | WEIGHT: 165 LBS | DIASTOLIC BLOOD PRESSURE: 60 MMHG | HEART RATE: 73 BPM | SYSTOLIC BLOOD PRESSURE: 142 MMHG

## 2025-09-02 DIAGNOSIS — I70.211 INTERMITTENT CLAUDICATION OF RIGHT LOWER EXTREMITY DUE TO ATHEROSCLEROSIS: Primary | ICD-10-CM

## 2025-09-02 ASSESSMENT — ENCOUNTER SYMPTOMS
OCCASIONAL FEELINGS OF UNSTEADINESS: 1
LOSS OF SENSATION IN FEET: 0
DEPRESSION: 0

## 2025-09-02 ASSESSMENT — PATIENT HEALTH QUESTIONNAIRE - PHQ9
SUM OF ALL RESPONSES TO PHQ9 QUESTIONS 1 AND 2: 0
2. FEELING DOWN, DEPRESSED OR HOPELESS: NOT AT ALL
1. LITTLE INTEREST OR PLEASURE IN DOING THINGS: NOT AT ALL

## 2025-09-05 ENCOUNTER — CLINICAL SUPPORT (OUTPATIENT)
Dept: CARDIAC REHAB | Facility: HOSPITAL | Age: 80
End: 2025-09-05
Payer: MEDICARE

## 2025-09-05 DIAGNOSIS — I70.211 INTERMITTENT CLAUDICATION OF RIGHT LOWER EXTREMITY DUE TO ATHEROSCLEROSIS: ICD-10-CM

## 2025-09-05 PROCEDURE — 93668 PERIPHERAL VASCULAR REHAB: CPT | Performed by: INTERNAL MEDICINE

## 2025-09-18 ENCOUNTER — APPOINTMENT (OUTPATIENT)
Dept: PRIMARY CARE | Facility: CLINIC | Age: 80
End: 2025-09-18
Payer: MEDICARE

## 2025-11-11 ENCOUNTER — APPOINTMENT (OUTPATIENT)
Dept: CARDIOLOGY | Facility: HOSPITAL | Age: 80
End: 2025-11-11
Payer: MEDICARE

## 2026-01-28 ENCOUNTER — APPOINTMENT (OUTPATIENT)
Dept: AUDIOLOGY | Facility: HOSPITAL | Age: 81
End: 2026-01-28
Payer: MEDICARE

## 2026-02-17 ENCOUNTER — APPOINTMENT (OUTPATIENT)
Dept: OPHTHALMOLOGY | Age: 81
End: 2026-02-17
Payer: MEDICARE

## 2026-07-01 ENCOUNTER — APPOINTMENT (OUTPATIENT)
Dept: DERMATOLOGY | Facility: CLINIC | Age: 81
End: 2026-07-01
Payer: MEDICARE